# Patient Record
Sex: MALE | Race: WHITE | NOT HISPANIC OR LATINO | Employment: FULL TIME | URBAN - METROPOLITAN AREA
[De-identification: names, ages, dates, MRNs, and addresses within clinical notes are randomized per-mention and may not be internally consistent; named-entity substitution may affect disease eponyms.]

---

## 2018-03-15 ENCOUNTER — OFFICE VISIT (OUTPATIENT)
Dept: INTERNAL MEDICINE | Facility: CLINIC | Age: 58
End: 2018-03-15
Payer: COMMERCIAL

## 2018-03-15 ENCOUNTER — LAB VISIT (OUTPATIENT)
Dept: LAB | Facility: HOSPITAL | Age: 58
End: 2018-03-15
Attending: INTERNAL MEDICINE
Payer: COMMERCIAL

## 2018-03-15 VITALS
SYSTOLIC BLOOD PRESSURE: 130 MMHG | HEART RATE: 65 BPM | DIASTOLIC BLOOD PRESSURE: 76 MMHG | TEMPERATURE: 98 F | BODY MASS INDEX: 31.9 KG/M2 | WEIGHT: 203.69 LBS | RESPIRATION RATE: 15 BRPM

## 2018-03-15 DIAGNOSIS — E78.5 HYPERLIPIDEMIA, UNSPECIFIED HYPERLIPIDEMIA TYPE: ICD-10-CM

## 2018-03-15 DIAGNOSIS — Z11.59 NEED FOR HEPATITIS C SCREENING TEST: ICD-10-CM

## 2018-03-15 DIAGNOSIS — Z00.00 ANNUAL PHYSICAL EXAM: Primary | ICD-10-CM

## 2018-03-15 DIAGNOSIS — J45.909 ASTHMA, UNSPECIFIED ASTHMA SEVERITY, UNSPECIFIED WHETHER COMPLICATED, UNSPECIFIED WHETHER PERSISTENT: ICD-10-CM

## 2018-03-15 DIAGNOSIS — E66.2 CLASS 1 OBESITY WITH ALVEOLAR HYPOVENTILATION, SERIOUS COMORBIDITY, AND BODY MASS INDEX (BMI) OF 31.0 TO 31.9 IN ADULT: ICD-10-CM

## 2018-03-15 DIAGNOSIS — Z76.89 ENCOUNTER TO ESTABLISH CARE WITH NEW DOCTOR: ICD-10-CM

## 2018-03-15 DIAGNOSIS — Z11.3 SCREEN FOR STD (SEXUALLY TRANSMITTED DISEASE): ICD-10-CM

## 2018-03-15 DIAGNOSIS — Z12.5 SCREENING FOR PROSTATE CANCER: ICD-10-CM

## 2018-03-15 DIAGNOSIS — Z23 NEED FOR TD VACCINE: ICD-10-CM

## 2018-03-15 DIAGNOSIS — Z00.00 ANNUAL PHYSICAL EXAM: ICD-10-CM

## 2018-03-15 LAB
25(OH)D3+25(OH)D2 SERPL-MCNC: 39 NG/ML
ALBUMIN SERPL BCP-MCNC: 3.7 G/DL
ALP SERPL-CCNC: 112 U/L
ALT SERPL W/O P-5'-P-CCNC: 27 U/L
ANION GAP SERPL CALC-SCNC: 9 MMOL/L
AST SERPL-CCNC: 29 U/L
BASOPHILS # BLD AUTO: 0.06 K/UL
BASOPHILS NFR BLD: 0.7 %
BILIRUB SERPL-MCNC: 0.5 MG/DL
BUN SERPL-MCNC: 16 MG/DL
CALCIUM SERPL-MCNC: 9.7 MG/DL
CHLORIDE SERPL-SCNC: 107 MMOL/L
CHOLEST SERPL-MCNC: 181 MG/DL
CHOLEST/HDLC SERPL: 3.4 {RATIO}
CO2 SERPL-SCNC: 26 MMOL/L
COMPLEXED PSA SERPL-MCNC: 0.59 NG/ML
CREAT SERPL-MCNC: 1 MG/DL
DIFFERENTIAL METHOD: ABNORMAL
EOSINOPHIL # BLD AUTO: 0.2 K/UL
EOSINOPHIL NFR BLD: 2.1 %
ERYTHROCYTE [DISTWIDTH] IN BLOOD BY AUTOMATED COUNT: 13.8 %
EST. GFR  (AFRICAN AMERICAN): >60 ML/MIN/1.73 M^2
EST. GFR  (NON AFRICAN AMERICAN): >60 ML/MIN/1.73 M^2
ESTIMATED AVG GLUCOSE: 108 MG/DL
GLUCOSE SERPL-MCNC: 99 MG/DL
HBA1C MFR BLD HPLC: 5.4 %
HCT VFR BLD AUTO: 45.2 %
HCV AB SERPL QL IA: NEGATIVE
HDLC SERPL-MCNC: 54 MG/DL
HDLC SERPL: 29.8 %
HGB BLD-MCNC: 16.1 G/DL
HIV 1+2 AB+HIV1 P24 AG SERPL QL IA: NEGATIVE
IMM GRANULOCYTES # BLD AUTO: 0.01 K/UL
IMM GRANULOCYTES NFR BLD AUTO: 0.1 %
LDLC SERPL CALC-MCNC: 110.8 MG/DL
LYMPHOCYTES # BLD AUTO: 1 K/UL
LYMPHOCYTES NFR BLD: 11.8 %
MCH RBC QN AUTO: 35.5 PG
MCHC RBC AUTO-ENTMCNC: 35.6 G/DL
MCV RBC AUTO: 100 FL
MONOCYTES # BLD AUTO: 0.6 K/UL
MONOCYTES NFR BLD: 7.4 %
NEUTROPHILS # BLD AUTO: 6.4 K/UL
NEUTROPHILS NFR BLD: 77.9 %
NONHDLC SERPL-MCNC: 127 MG/DL
NRBC BLD-RTO: 0 /100 WBC
PLATELET # BLD AUTO: 212 K/UL
PMV BLD AUTO: 11.6 FL
POTASSIUM SERPL-SCNC: 4.8 MMOL/L
PROT SERPL-MCNC: 7.3 G/DL
RBC # BLD AUTO: 4.53 M/UL
SODIUM SERPL-SCNC: 142 MMOL/L
T4 FREE SERPL-MCNC: 0.8 NG/DL
TRIGL SERPL-MCNC: 81 MG/DL
TSH SERPL DL<=0.005 MIU/L-ACNC: 3.49 UIU/ML
WBC # BLD AUTO: 8.19 K/UL

## 2018-03-15 PROCEDURE — 90714 TD VACC NO PRESV 7 YRS+ IM: CPT | Mod: S$GLB,,, | Performed by: INTERNAL MEDICINE

## 2018-03-15 PROCEDURE — 99386 PREV VISIT NEW AGE 40-64: CPT | Mod: 25,S$GLB,, | Performed by: INTERNAL MEDICINE

## 2018-03-15 PROCEDURE — 84153 ASSAY OF PSA TOTAL: CPT

## 2018-03-15 PROCEDURE — 99999 PR PBB SHADOW E&M-NEW PATIENT-LVL III: CPT | Mod: PBBFAC,,, | Performed by: INTERNAL MEDICINE

## 2018-03-15 PROCEDURE — 82306 VITAMIN D 25 HYDROXY: CPT

## 2018-03-15 PROCEDURE — 36415 COLL VENOUS BLD VENIPUNCTURE: CPT | Mod: PO

## 2018-03-15 PROCEDURE — 84443 ASSAY THYROID STIM HORMONE: CPT

## 2018-03-15 PROCEDURE — 86592 SYPHILIS TEST NON-TREP QUAL: CPT

## 2018-03-15 PROCEDURE — 90471 IMMUNIZATION ADMIN: CPT | Mod: S$GLB,,, | Performed by: INTERNAL MEDICINE

## 2018-03-15 PROCEDURE — 80061 LIPID PANEL: CPT

## 2018-03-15 PROCEDURE — 86803 HEPATITIS C AB TEST: CPT

## 2018-03-15 PROCEDURE — 87529 HSV DNA AMP PROBE: CPT | Mod: 59

## 2018-03-15 PROCEDURE — 80053 COMPREHEN METABOLIC PANEL: CPT

## 2018-03-15 PROCEDURE — 85025 COMPLETE CBC W/AUTO DIFF WBC: CPT

## 2018-03-15 PROCEDURE — 84439 ASSAY OF FREE THYROXINE: CPT

## 2018-03-15 PROCEDURE — 86703 HIV-1/HIV-2 1 RESULT ANTBDY: CPT

## 2018-03-15 PROCEDURE — 83036 HEMOGLOBIN GLYCOSYLATED A1C: CPT

## 2018-03-15 RX ORDER — ATORVASTATIN CALCIUM 20 MG/1
20 TABLET, FILM COATED ORAL DAILY
Qty: 90 TABLET | Refills: 3 | Status: SHIPPED | OUTPATIENT
Start: 2018-03-15 | End: 2018-08-31 | Stop reason: SDUPTHER

## 2018-03-15 RX ORDER — ATORVASTATIN CALCIUM 20 MG/1
20 TABLET, FILM COATED ORAL DAILY
Qty: 30 TABLET | Refills: 5 | Status: SHIPPED | OUTPATIENT
Start: 2018-03-15 | End: 2018-03-15 | Stop reason: SDUPTHER

## 2018-03-15 RX ORDER — ALBUTEROL SULFATE 90 UG/1
1-2 AEROSOL, METERED RESPIRATORY (INHALATION) EVERY 6 HOURS PRN
Qty: 18 G | Refills: 6 | Status: SHIPPED | OUTPATIENT
Start: 2018-03-15 | End: 2018-10-01 | Stop reason: SDUPTHER

## 2018-03-15 NOTE — PROGRESS NOTES
Subjective:       Patient ID: Maxx Lo is a 57 y.o. male.    Chief Complaint: Annual Exam and Establish Care    HPI   57 y.o. male here for annual physical exam.  He is new to me.  He was seeing doctor at [facility]    Chronic medical issues:  HLD: stable on statin, ran out recently and needs refill    Asthma: notes some wheezing recently, no cough, no fevers/chills, no recent exacerbation, ran out of inhaler    Health maintenance    Vaccines: Influenza declines (yearly)  Tetanus due (every 10 yrs - 1st tdap);   Pneumovax UTD   A1c: due (>45 yearly)  HIV: agrees ages 15-65  Sexual Screening: negative  STD screening: agrees  Eye exam:due, will check with insurance annual  DDS exam: due, will make appt q6 mos.  Prostate: PSA annual due  Colonoscopy: due 2019   Lipid disorders: due ages >/= 45 or >/= 20 if increased ASCVD risk   Hepatitis C: due one time born between [id number]      Exercise: not currently exercise  Diet: not eating healthy, lots of fast food      Past Medical History:   Diagnosis Date    ALLERGIC RHINITIS     Bronchial asthma     Hyperlipidemia     Hypertension     not on medication    Obesity       Past Surgical History:   Procedure Laterality Date    Repair ruptured left biceps  2004     Social History     Social History    Marital status:      Spouse name: N/A    Number of children: N/A    Years of education: N/A     Occupational History    mel      Social History Main Topics    Smoking status: Never Smoker    Smokeless tobacco: Never Used    Alcohol use 1.5 oz/week     3 Standard drinks or equivalent per week      Comment: 2x/week    Drug use: No    Sexual activity: Not on file     Other Topics Concern    Not on file     Social History Narrative    No narrative on file     Review of patient's allergies indicates:  No Known Allergies  Mr. Lo had no medications administered during this visit.      Review of Systems   Constitutional: Positive for  fatigue. Negative for activity change, chills and fever.   HENT: Negative for congestion, rhinorrhea, sinus pain, sinus pressure and sore throat.    Eyes: Negative for pain and redness.   Respiratory: Positive for wheezing (occasional). Negative for cough and shortness of breath.    Cardiovascular: Negative for chest pain and palpitations.   Gastrointestinal: Negative for abdominal pain, diarrhea, nausea and vomiting.   Genitourinary: Positive for urgency (+dribbling). Negative for difficulty urinating.   Musculoskeletal: Negative for arthralgias and back pain.   Skin: Negative for rash.   Neurological: Negative for weakness and headaches.   Psychiatric/Behavioral: Negative for dysphoric mood and sleep disturbance.     Objective:     Vitals:    03/15/18 0807   BP: 130/76   Pulse: 65   Resp: 15   Temp: 98.2 °F (36.8 °C)    Body mass index is 31.9 kg/m².  Physical Exam   Constitutional: He is oriented to person, place, and time. He appears well-developed and well-nourished.   HENT:   Head: Normocephalic and atraumatic.   Mouth/Throat: Oropharynx is clear and moist.   Eyes: Conjunctivae are normal. Pupils are equal, round, and reactive to light.   Neck: Normal range of motion. Neck supple. No thyromegaly present.   Cardiovascular: Normal rate, regular rhythm and normal heart sounds.  Exam reveals no gallop and no friction rub.    No murmur heard.  Pulmonary/Chest: Effort normal. He has wheezes (few expiratory wheezes noted). He has no rales.   Abdominal: Soft. Bowel sounds are normal. There is no tenderness. There is no rebound and no guarding.   Musculoskeletal: Normal range of motion. He exhibits edema (mild pedal edema b/l). He exhibits no tenderness.   Lymphadenopathy:     He has no cervical adenopathy.   Neurological: He is alert and oriented to person, place, and time.   Skin: Skin is warm and dry. No rash noted. No erythema.   Psychiatric: He has a normal mood and affect. Judgment normal.     Assessment:     1.  Annual physical exam    2. Encounter to establish care with new doctor    3. Need for hepatitis C screening test    4. Screening for prostate cancer    5. Screen for STD (sexually transmitted disease)    6. Hyperlipidemia, unspecified hyperlipidemia type    7. Asthma, unspecified asthma severity, unspecified whether complicated, unspecified whether persistent    8. Need for TD vaccine    9. Class 1 obesity with alveolar hypoventilation, serious comorbidity, and body mass index (BMI) of 31.0 to 31.9 in adult      Plan:   1. Annual physical exam  - CBC auto differential; Future  - Comprehensive metabolic panel; Future  - Hemoglobin A1c; Future  - Lipid panel; Future  - TSH; Future  - Urinalysis Microscopic; Future  - Vitamin D; Future  - T4, free; Future  - C. trachomatis/N. gonorrhoeae by AMP DNA Urine; Future  - Herpes simplex Virus (HSV) Type 1 & 2 DNA by PCR; Future  - HIV-1 and HIV-2 antibodies; Future  - RPR; Future    2. Encounter to establish care with new doctor    3. Need for hepatitis C screening test  - Hepatitis C antibody; Future    4. Screening for prostate cancer  - PSA, Screening; Future    5. Screen for STD (sexually transmitted disease)  - C. trachomatis/N. gonorrhoeae by AMP DNA Urine; Future  - Herpes simplex Virus (HSV) Type 1 & 2 DNA by PCR; Future  - HIV-1 and HIV-2 antibodies; Future  - RPR; Future    6. Hyperlipidemia, unspecified hyperlipidemia type  - atorvastatin (LIPITOR) 20 MG tablet; Take 1 tablet (20 mg total) by mouth once daily.  Dispense: 90 tablet; Refill: 3    7. Asthma, unspecified asthma severity, unspecified whether complicated, unspecified whether persistent  - counseled to use inhaler, if breathing worsens, please call or go to ER if severe  - albuterol (PROAIR HFA) 90 mcg/actuation inhaler; Inhale 1-2 puffs into the lungs every 6 (six) hours as needed for Wheezing or Shortness of Breath. generic ok  Dispense: 18 g; Refill: 6    8. Need for TD vaccine  - (In Office  Administered) Td Vaccine    9. Class 1 obesity with alveolar hypoventilation, serious comorbidity, and body mass index (BMI) of 31.0 to 31.9 in adult  - Counseled patient on need to get regular exercise at least 30 minutes a day at high intensity 5 days a week.  - counseled on HHD      Return to clinic in one year for annual exam or sooner if dictated by labs or illness.

## 2018-03-15 NOTE — PATIENT INSTRUCTIONS
Exercise for a Healthier Heart  You may wonder how you can improve the health of your heart. If youre thinking about exercise, youre on the right track. You dont need to become an athlete, but you do need a certain amount of brisk exercise to help strengthen your heart. If you have been diagnosed with a heart condition, your doctor may recommend exercise to help stabilize your condition. To help make exercise a habit, choose safe, fun activities.     Exercise with a friend. When activity is fun, you're more likely to stick with it.     Be sure to check with your healthcare provider before starting an exercise program.   Why exercise?  Exercising regularly offers many healthy rewards. It can help you do all of the following:  · Improve your blood cholesterol level to help prevent further heart trouble  · Lower your blood pressure to help prevent a stroke or heart attack  · Control diabetes, or reduce your risk of getting this disease  · Improve your heart and lung function  · Reach and maintain a healthy weight  · Make your muscles stronger and more limber so you can stay active  · Prevent falls and fractures by slowing the loss of bone mass (osteoporosis)  · Manage stress better  · Reduce your blood pressure  · Improve your sense of self and your body image  Exercise tips  Ease into your routine. Set small goals. Then build on them.  Exercise on most days. Aim for a total of 150 or more minutes of moderate to  vigorous intensity activity each week. Consider 40 minutes, 3 to 4 times a week. For best results, activity should last for 40 minutes on average. It is OK to work up to the 40 minute period over time. Examples of moderate-intensity activity is walking 1 mile in 15 minutes or 30 to 45 minutes of yard work.  Step up your daily activity level. Along with your exercise program, try being more active throughout the day. Walk instead of drive. Do more household tasks or yard work.  Choose one or more  activities you enjoy. Walking is one of the easiest things you can do. You can also try swimming, riding a bike, dancing, or taking an exercise class.  Stop exercising and call your doctor if you:  · Have chest pain or feel dizzy or lightheaded  · Feel burning, tightness, pressure, or heaviness in your chest, neck, shoulders, back, or arms  · Have unusual shortness of breath  · Have increased joint or muscle pain  · Have palpitations or an irregular heartbeat   Date Last Reviewed: 5/1/2016 © 2000-2017 [employer]. [address] [place] [address]. All rights reserved. This information is not intended as a substitute for professional medical care. Always follow your healthcare professional's instructions.      Mediterranean style diet:    Eat:  Olive oil, lean meats such as chicken and fish and only small servings of carbohydrates.   Olive oil and vinegar instead of low fat salad dressings.  Cook food in olive oil.  You can pan husain or saute fish and vegetables instead of boiling or baking.  Unsalted nuts for snacks. Walnuts, cashews, almonds, pecans and pistachios ( not peanuts). Try almond butter or cashew butter on toast or crackers.  Brown bread. You can also dip bread in olive oil and eat it as a snack or appetizer  Seasonal or frozen vegetables and fruits.     Avoid:  Saturated fats and deep fried foods. Also stay away from large servings of starches, sweets, desserts and sugary drinks (both sodas and fruit juices)

## 2018-03-16 LAB
HSV-1 DNA BY PCR: NEGATIVE
HSV-2 DNA BY PCR: NEGATIVE
RPR SER QL: NORMAL

## 2018-03-19 ENCOUNTER — TELEPHONE (OUTPATIENT)
Dept: INTERNAL MEDICINE | Facility: CLINIC | Age: 58
End: 2018-03-19

## 2018-03-19 DIAGNOSIS — D53.9 MACROCYTIC ANEMIA: Primary | ICD-10-CM

## 2018-03-19 NOTE — TELEPHONE ENCOUNTER
Please notify patient:  Labs show increase in size of red blood cells.  I would like to screen for vitamin deficiency as this can be a sign of this. (orders placed)    I have reviewed the rest of the lab results which are normal or stable.     Thanks,  Marina Smith MD

## 2018-03-19 NOTE — LETTER
March 20, 2018    Maxx Lo  [address] [facility] [place] [address]             [facility]  [address] [facility] [address]  Phone: [phone]  Fax: [phone] Dear Mr. Lo:    Your labs show increase in size of red blood cells.  I would like to screen for vitamin deficiency as this can be a sign of this. Please call to schedule lab appointment.     I have reviewed the rest of the lab results which are normal or stable.      Thanks,  Marina Smith MD    If you have any questions or concerns, please don't hesitate to call.    Sincerely,        Deena Guerrero LPN

## 2018-08-31 DIAGNOSIS — E78.5 HYPERLIPIDEMIA, UNSPECIFIED HYPERLIPIDEMIA TYPE: ICD-10-CM

## 2018-08-31 RX ORDER — ATORVASTATIN CALCIUM 20 MG/1
TABLET, FILM COATED ORAL
Qty: 30 TABLET | Refills: 2 | Status: SHIPPED | OUTPATIENT
Start: 2018-08-31 | End: 2018-11-14

## 2018-10-01 ENCOUNTER — HOSPITAL ENCOUNTER (EMERGENCY)
Facility: HOSPITAL | Age: 58
Discharge: HOME | End: 2018-10-01
Attending: EMERGENCY MEDICINE
Payer: COMMERCIAL

## 2018-10-01 VITALS
HEIGHT: 67 IN | HEART RATE: 75 BPM | BODY MASS INDEX: 31.39 KG/M2 | DIASTOLIC BLOOD PRESSURE: 86 MMHG | TEMPERATURE: 98 F | SYSTOLIC BLOOD PRESSURE: 158 MMHG | OXYGEN SATURATION: 95 % | WEIGHT: 200 LBS | RESPIRATION RATE: 20 BRPM

## 2018-10-01 DIAGNOSIS — R05.9 COUGH: ICD-10-CM

## 2018-10-01 DIAGNOSIS — M25.512 LEFT SHOULDER PAIN: ICD-10-CM

## 2018-10-01 DIAGNOSIS — S22.32XA CLOSED FRACTURE OF ONE RIB OF LEFT SIDE, INITIAL ENCOUNTER: Primary | ICD-10-CM

## 2018-10-01 DIAGNOSIS — J45.909 ASTHMA, UNSPECIFIED ASTHMA SEVERITY, UNSPECIFIED WHETHER COMPLICATED, UNSPECIFIED WHETHER PERSISTENT: ICD-10-CM

## 2018-10-01 PROBLEM — S01.81XA LACERATION OF FOREHEAD: Status: ACTIVE | Noted: 2018-10-01

## 2018-10-01 PROCEDURE — 90471 IMMUNIZATION ADMIN: CPT | Performed by: EMERGENCY MEDICINE

## 2018-10-01 PROCEDURE — 99284 EMERGENCY DEPT VISIT MOD MDM: CPT | Mod: 25

## 2018-10-01 PROCEDURE — 63600175 PHARM REV CODE 636 W HCPCS: Performed by: EMERGENCY MEDICINE

## 2018-10-01 PROCEDURE — 94799 UNLISTED PULMONARY SVC/PX: CPT

## 2018-10-01 PROCEDURE — 94640 AIRWAY INHALATION TREATMENT: CPT

## 2018-10-01 PROCEDURE — 90715 TDAP VACCINE 7 YRS/> IM: CPT | Performed by: EMERGENCY MEDICINE

## 2018-10-01 PROCEDURE — 25000242 PHARM REV CODE 250 ALT 637 W/ HCPCS: Performed by: EMERGENCY MEDICINE

## 2018-10-01 PROCEDURE — 25000003 PHARM REV CODE 250: Performed by: EMERGENCY MEDICINE

## 2018-10-01 RX ORDER — HYDROCODONE BITARTRATE AND ACETAMINOPHEN 10; 325 MG/1; MG/1
1 TABLET ORAL
Status: COMPLETED | OUTPATIENT
Start: 2018-10-01 | End: 2018-10-01

## 2018-10-01 RX ORDER — HYDROCODONE BITARTRATE AND ACETAMINOPHEN 10; 325 MG/1; MG/1
1 TABLET ORAL EVERY 8 HOURS PRN
Qty: 18 TABLET | Refills: 0 | Status: SHIPPED | OUTPATIENT
Start: 2018-10-01 | End: 2018-10-19 | Stop reason: ALTCHOICE

## 2018-10-01 RX ORDER — DIAZEPAM 5 MG/1
5 TABLET ORAL
Status: COMPLETED | OUTPATIENT
Start: 2018-10-01 | End: 2018-10-01

## 2018-10-01 RX ORDER — IPRATROPIUM BROMIDE AND ALBUTEROL SULFATE 2.5; .5 MG/3ML; MG/3ML
3 SOLUTION RESPIRATORY (INHALATION)
Status: COMPLETED | OUTPATIENT
Start: 2018-10-01 | End: 2018-10-01

## 2018-10-01 RX ORDER — BACITRACIN ZINC 500 UNIT/G
OINTMENT (GRAM) TOPICAL
Status: COMPLETED | OUTPATIENT
Start: 2018-10-01 | End: 2018-10-01

## 2018-10-01 RX ORDER — AZITHROMYCIN 250 MG/1
250 TABLET, FILM COATED ORAL DAILY
Qty: 6 TABLET | Refills: 0 | Status: SHIPPED | OUTPATIENT
Start: 2018-10-01 | End: 2018-10-19 | Stop reason: ALTCHOICE

## 2018-10-01 RX ORDER — ALBUTEROL SULFATE 90 UG/1
1-2 AEROSOL, METERED RESPIRATORY (INHALATION) EVERY 6 HOURS PRN
Qty: 18 G | Refills: 1 | Status: SHIPPED | OUTPATIENT
Start: 2018-10-01 | End: 2020-09-23 | Stop reason: SDUPTHER

## 2018-10-01 RX ORDER — NAPROXEN 500 MG/1
500 TABLET ORAL
Status: COMPLETED | OUTPATIENT
Start: 2018-10-01 | End: 2018-10-01

## 2018-10-01 RX ADMIN — CLOSTRIDIUM TETANI TOXOID ANTIGEN (FORMALDEHYDE INACTIVATED), CORYNEBACTERIUM DIPHTHERIAE TOXOID ANTIGEN (FORMALDEHYDE INACTIVATED), BORDETELLA PERTUSSIS TOXOID ANTIGEN (GLUTARALDEHYDE INACTIVATED), BORDETELLA PERTUSSIS FILAMENTOUS HEMAGGLUTININ ANTIGEN (FORMALDEHYDE INACTIVATED), BORDETELLA PERTUSSIS PERTACTIN ANTIGEN, AND BORDETELLA PERTUSSIS FIMBRIAE 2/3 ANTIGEN 0.5 ML: 5; 2; 2.5; 5; 3; 5 INJECTION, SUSPENSION INTRAMUSCULAR at 06:10

## 2018-10-01 RX ADMIN — NAPROXEN 500 MG: 500 TABLET ORAL at 06:10

## 2018-10-01 RX ADMIN — HYDROCODONE BITARTRATE AND ACETAMINOPHEN 1 TABLET: 10; 325 TABLET ORAL at 06:10

## 2018-10-01 RX ADMIN — DIAZEPAM 5 MG: 5 TABLET ORAL at 06:10

## 2018-10-01 RX ADMIN — IPRATROPIUM BROMIDE AND ALBUTEROL SULFATE 3 ML: .5; 3 SOLUTION RESPIRATORY (INHALATION) at 06:10

## 2018-10-01 RX ADMIN — BACITRACIN: 500 OINTMENT TOPICAL at 06:10

## 2018-10-01 NOTE — ED NOTES
Neuro: AAOx3  HEENT: WDL  Resp: Airway patent, SOB reported, Pt has cough and left rib pain.   Cardiac: Skin pink/warm/dry, pulses intact. Pt denies any chest pain  Abdomen: Soft, non-tender to palpation, denies N/V/D  : No signs or symptoms of urinary disturbances  Musculoskeletal: Left sided rib pain, upper back pain, pt able to move all extremities  Skin: small abrasion to left forehead. Scant bleeding to wound. Abrasion to left elbow. Wounds cleaned with normal saline. Ointment applied to wounds.    Patient reports he was stepping out of car and fell and hit his head on the concrete. Pt has abrasion to left forehead and left elbow. He also reports some SOB with cough. Pt splinting left side when he coughs.

## 2018-10-01 NOTE — ED PROVIDER NOTES
"Encounter Date: 10/1/2018    SCRIBE #1 NOTE: I, Rickey Woodard, am scribing for, and in the presence of,  Dr. Nuria Desai. I have scribed the entire note.       History     Chief Complaint   Patient presents with    Fall     pt fell last night onto his left side. Also hit his head on the ground. C/o pain to left upper back and generalized soreness.     Maxx Lo is a 57 y/o male who  has a past medical history of ALLERGIC RHINITIS, Bronchial asthma, Hyperlipidemia, Hypertension, and Obesity.    The patient presents to the ED due to fall that occurred last night. Patient reports he lost his balance while getting out of his vehicle and fell on his left side, hitting his head on the ground. He admits to cough, left upper back pain, and "chest cold" but denies any neck pain, dizziness, or LOC. He is not on any anticoagulation. Patient unsure about Tetanus status. He denies smoking cigarettes. Patient also requesting for albuterol inhaler refill. He denies any other physical complaints.        The history is provided by the patient.     Review of patient's allergies indicates:  No Known Allergies  Past Medical History:   Diagnosis Date    ALLERGIC RHINITIS     Bronchial asthma     Hyperlipidemia     Hypertension     not on medication    Obesity      Past Surgical History:   Procedure Laterality Date    Repair ruptured left biceps  2004     Family History   Problem Relation Age of Onset    Coronary artery disease Father     Diabetes Mother     Heart disease Mother     Diabetes Sister     Hypertension Brother      Social History     Tobacco Use    Smoking status: Never Smoker    Smokeless tobacco: Never Used   Substance Use Topics    Alcohol use: Yes     Alcohol/week: 1.5 oz     Types: 3 Standard drinks or equivalent per week     Comment: 2x/week    Drug use: No     Review of Systems   Constitutional: Negative for chills and fever.   HENT: Negative for congestion, ear pain, rhinorrhea and sore " throat.    Eyes: Negative for pain and visual disturbance.   Respiratory: Positive for cough. Negative for shortness of breath and wheezing.    Cardiovascular: Negative for chest pain and palpitations.   Gastrointestinal: Negative for abdominal pain, diarrhea, nausea and vomiting.   Genitourinary: Negative for dysuria and hematuria.   Musculoskeletal: Positive for back pain. Negative for myalgias and neck pain.   Skin: Negative for rash.   Neurological: Negative for dizziness, syncope, weakness, light-headedness and headaches.   Psychiatric/Behavioral: Negative for confusion.   All other systems reviewed and are negative.      Physical Exam     Initial Vitals [10/01/18 1549]   BP Pulse Resp Temp SpO2   (!) 184/105 91 20 98.6 °F (37 °C) 98 %      MAP       --         Physical Exam    Nursing note and vitals reviewed.  Constitutional: He appears well-developed and well-nourished. He is not diaphoretic. No distress.   HENT:   Head: Normocephalic. Head is without raccoon's eyes, without Erickson's sign and without contusion.   Right Ear: Tympanic membrane, external ear and ear canal normal.   Left Ear: Tympanic membrane, external ear and ear canal normal.   Nose: Nose normal. No nasal septal hematoma.   3 cm linear laceration to left forehead.  No hemotympanum.   Eyes: Conjunctivae and EOM are normal. Pupils are equal, round, and reactive to light. Right eye exhibits no discharge. Left eye exhibits no discharge. No scleral icterus.   Neck: Normal range of motion. Neck supple.   Cardiovascular: Normal rate, regular rhythm, normal heart sounds and intact distal pulses. Exam reveals no gallop and no friction rub.    No murmur heard.  Pulmonary/Chest: No respiratory distress. He has no wheezes. He has no rhonchi. He has no rales.   Mildly coarse breath sounds bilaterally.   Abdominal: Soft. Bowel sounds are normal. He exhibits no distension. There is no tenderness. There is no rebound and no guarding.   Musculoskeletal:  Normal range of motion. He exhibits tenderness. He exhibits no edema.   No bony midline C/T/L spine TTP.  Superficial abrasion to left elbow.  2+ radial pulse bilaterally.  Left sided thoracic paraspinal muscular TTP.   Neurological: He is alert and oriented to person, place, and time. He has normal strength. No cranial nerve deficit or sensory deficit. GCS score is 15. GCS eye subscore is 4. GCS verbal subscore is 5. GCS motor subscore is 6.   Skin: Skin is warm and dry. Capillary refill takes less than 2 seconds.   Psychiatric: He has a normal mood and affect.         ED Course   Procedures  Labs Reviewed - No data to display       Imaging Results          X-Ray Chest PA And Lateral (Final result)  Result time 10/01/18 18:37:07    Final result by Donnie Samano MD (10/01/18 18:37:07)                 Impression:      No acute cardiopulmonary process identified.      Electronically signed by: Donnie Samano MD  Date:    10/01/2018  Time:    18:37             Narrative:    EXAMINATION:  XR CHEST PA AND LATERAL    CLINICAL HISTORY:  Cough    TECHNIQUE:  PA and lateral views of the chest were performed.    COMPARISON:  September 2011.    FINDINGS:  Cardiac silhouette is normal in size.  Lungs are symmetrically expanded.  No evidence of focal consolidative process, pneumothorax, or significant effusion.  No acute osseous abnormality identified.                               CT Head Without Contrast (Final result)  Result time 10/01/18 18:01:56    Final result by Addison Bui MD (10/01/18 18:01:56)                 Impression:      No acute intracranial abnormality.      Electronically signed by: Addison Bui MD  Date:    10/01/2018  Time:    18:01             Narrative:    EXAMINATION:  CT HEAD WITHOUT CONTRAST    CLINICAL HISTORY:  fall;    TECHNIQUE:  Low dose axial CT images obtained throughout the head without intravenous contrast. Sagittal and coronal reconstructions were  performed.    COMPARISON:  None.    FINDINGS:  Intracranial compartment:    Ventricles and sulci are normal in size for age without evidence of hydrocephalus. No extra-axial blood or fluid collections.    The brain parenchyma appears normal. No parenchymal mass, hemorrhage, edema or major vascular distribution infarct.    Skull/extracranial contents (limited evaluation): No fracture. Mastoid air cells and paranasal sinuses are essentially clear.  Visualized portions of the orbits are within normal limits.                               X-Ray Shoulder Trauma Left (Final result)  Result time 10/01/18 17:59:07    Final result by Addison Bui MD (10/01/18 17:59:07)                 Impression:      Posterolateral left 5th rib acute fracture.      Electronically signed by: Addison Bui MD  Date:    10/01/2018  Time:    17:59             Narrative:    EXAMINATION:  XR SHOULDER TRAUMA 3 VIEW LEFT    CLINICAL HISTORY:  Pain in left shoulder    TECHNIQUE:  Three views of the left shoulder were performed.    COMPARISON  Chest radiograph 09/09/2011    FINDINGS:  Bones are well mineralized.  Overall alignment is within normal limits.  There is displaced fracture with mild overlap at the posterolateral left 5th rib.  No dislocation or destructive osseous process.  Mild degenerative change at the left AC joint..  No subcutaneous emphysema or radiodense retained foreign body.  No left apical pneumothorax.                                 Medical Decision Making:   Initial Assessment:   Maxx Lo is a 59 y/o male presents to the ED due to fall that occurred last night.  Plan to obtain imaging, treat symptomatically, and reassess.  Differential Diagnosis:   Laceration, abrasion, contusion, fracture, dislocation  Clinical Tests:   Radiological Study: Ordered and Reviewed  ED Management:  Upon re-evaluation, the patient's status has improved.  After complete ED evaluation, clinical impression is most consistent with  fracture rib, laceration.  Laceration covered with Steri-Strips since it has been greater than 24 hr since onset.  ED workup reveals a fractured rib.  Patient was given incentive spirometer.  Tetanus vaccination updated  At this time, I feel there is no emergent condition requiring further evaluation or admission. I believe the patient is stable for discharge from the ED. The patient and any additional family present were updated with test results, overall clinical impression, and recommended further plan of care. All questions were answered. The patient expressed understanding and agreed with current plan for discharge with PCP follow-up within 1 week. Strict return precautions were provided, including CP, SOB, HA, return/worsening of current symptoms or any other concerns.                         Clinical Impression:   .  1. Closed fracture of one rib of left side, initial encounter    2. Left shoulder pain    3. Cough    4. Asthma, unspecified asthma severity, unspecified whether complicated, unspecified whether persistent            Disposition:   Disposition: Discharged  Condition: Stable       I, Nuria Desai,  personally performed the services described in this documentation. All medical record entries made by the scribe were at my direction and in my presence.  I have reviewed the chart and agree that the record reflects my personal performance and is accurate and complete. Nuria Desai M.D. 3:11 PM10/02/2018                   Nuria Desai MD  10/02/18 151

## 2018-10-02 ENCOUNTER — OFFICE VISIT (OUTPATIENT)
Dept: INTERNAL MEDICINE | Facility: CLINIC | Age: 58
End: 2018-10-02
Payer: COMMERCIAL

## 2018-10-02 VITALS
DIASTOLIC BLOOD PRESSURE: 100 MMHG | HEART RATE: 64 BPM | BODY MASS INDEX: 32.21 KG/M2 | TEMPERATURE: 98 F | HEIGHT: 67 IN | OXYGEN SATURATION: 98 % | RESPIRATION RATE: 18 BRPM | WEIGHT: 205.25 LBS | SYSTOLIC BLOOD PRESSURE: 140 MMHG

## 2018-10-02 DIAGNOSIS — S22.32XD CLOSED FRACTURE OF ONE RIB OF LEFT SIDE WITH ROUTINE HEALING, SUBSEQUENT ENCOUNTER: Primary | ICD-10-CM

## 2018-10-02 PROCEDURE — 3080F DIAST BP >= 90 MM HG: CPT | Mod: CPTII,S$GLB,, | Performed by: INTERNAL MEDICINE

## 2018-10-02 PROCEDURE — 99214 OFFICE O/P EST MOD 30 MIN: CPT | Mod: S$GLB,,, | Performed by: INTERNAL MEDICINE

## 2018-10-02 PROCEDURE — 3077F SYST BP >= 140 MM HG: CPT | Mod: CPTII,S$GLB,, | Performed by: INTERNAL MEDICINE

## 2018-10-02 PROCEDURE — 3008F BODY MASS INDEX DOCD: CPT | Mod: CPTII,S$GLB,, | Performed by: INTERNAL MEDICINE

## 2018-10-02 PROCEDURE — 99999 PR PBB SHADOW E&M-EST. PATIENT-LVL IV: CPT | Mod: PBBFAC,,, | Performed by: INTERNAL MEDICINE

## 2018-10-02 RX ORDER — NAPROXEN 500 MG/1
TABLET ORAL
Qty: 30 TABLET | Refills: 2 | Status: SHIPPED | OUTPATIENT
Start: 2018-10-02 | End: 2018-10-19 | Stop reason: SINTOL

## 2018-10-02 RX ORDER — LIDOCAINE 50 MG/G
1 PATCH TOPICAL DAILY
Qty: 30 PATCH | Refills: 2 | Status: SHIPPED | OUTPATIENT
Start: 2018-10-02 | End: 2018-11-08

## 2018-10-02 NOTE — PROGRESS NOTES
"Subjective:       Patient ID: Maxx Lo is a 58 y.o. male.    Chief Complaint: Establish Care; Fall (2 days ago. went to ER. Dx; broken rib); and Back Pain (from fall, 9/10 pain level)    HPI    He presents for evaluation of rib pain following ER visit yesterday. He was given a work excuse for light-duty, but his work needed completion of form which specifies exactly what physical activity he is allowed to do. He was seen in the ER yesterday following a fall which occurred at home.  X-ray obtained in the ER showed a posterolateral left 5th rib fracture. He was discharged with hydrocodone-acetominophen 10-325mg, which he states is not helping his pain. He is requesting a stronger pain medication.     He denies dyspnea. He is using incentive spirometer.     Review of Systems   Constitutional: Negative for chills and fever.   Respiratory: Positive for cough. Negative for shortness of breath.    Cardiovascular: Negative for chest pain.   Gastrointestinal: Negative for abdominal pain and blood in stool.   Genitourinary: Negative for difficulty urinating.   Musculoskeletal: Positive for arthralgias and myalgias.   Skin: Negative for rash.   Neurological: Negative for weakness and numbness.       Objective:        Vitals:    10/02/18 1507   BP: (!) 140/100   Pulse: 64   Resp: 18   Temp: 98.2 °F (36.8 °C)   SpO2: 98%   Weight: 93.1 kg (205 lb 4 oz)   Height: 5' 7" (1.702 m)       Body mass index is 32.15 kg/m².    Physical Exam   Constitutional: He is oriented to person, place, and time. He appears well-developed and well-nourished. No distress.   HENT:   Head: Normocephalic and atraumatic.   Nose: Nose normal.   Eyes: Conjunctivae and EOM are normal. Right eye exhibits no discharge. Left eye exhibits no discharge.   Neck: Normal range of motion. Neck supple.   Cardiovascular: Normal rate, regular rhythm, normal heart sounds and intact distal pulses.   Pulmonary/Chest: Effort normal. No accessory muscle usage. No " respiratory distress. He has no decreased breath sounds. He has wheezes.   Abdominal: Soft. Bowel sounds are normal.   Musculoskeletal: He exhibits no edema.        Left shoulder: He exhibits decreased range of motion.        Thoracic back: He exhibits tenderness and pain.   Neurological: He is alert and oriented to person, place, and time.   Skin: Skin is warm and dry. He is not diaphoretic. No erythema.   Psychiatric: He has a normal mood and affect. His behavior is normal. Thought content normal.       Assessment:     1. Closed fracture of one rib of left side with routine healing, subsequent encounter           Plan:         1. Closed fracture of one rib of left side with routine healing, subsequent encounter  - form completed  - can continue norco prn pain. I discussed with him that he may add tylenol for additional pain relief but limit to less than 4 grams per day (incudling norco).  - lidocaine (LIDODERM) 5 %; Place 1 patch onto the skin once daily.  Dispense: 30 patch; Refill: 2  - naproxen (NAPROSYN) 500 MG tablet; BIDWM x 7 days, then take BID prn pain  Dispense: 30 tablet; Refill: 2           Patient note was created using MModal Dictation.  Any errors in syntax or even information may not have been identified and edited on initial review prior to signing this note.

## 2018-10-19 ENCOUNTER — OFFICE VISIT (OUTPATIENT)
Dept: INTERNAL MEDICINE | Facility: CLINIC | Age: 58
End: 2018-10-19
Payer: COMMERCIAL

## 2018-10-19 ENCOUNTER — HOSPITAL ENCOUNTER (OUTPATIENT)
Dept: RADIOLOGY | Facility: HOSPITAL | Age: 58
Discharge: HOME | End: 2018-10-19
Attending: INTERNAL MEDICINE
Payer: COMMERCIAL

## 2018-10-19 VITALS
WEIGHT: 205 LBS | HEART RATE: 56 BPM | RESPIRATION RATE: 16 BRPM | BODY MASS INDEX: 32.18 KG/M2 | DIASTOLIC BLOOD PRESSURE: 100 MMHG | TEMPERATURE: 98 F | SYSTOLIC BLOOD PRESSURE: 162 MMHG | HEIGHT: 67 IN

## 2018-10-19 DIAGNOSIS — J98.11 ATELECTASIS: ICD-10-CM

## 2018-10-19 DIAGNOSIS — S22.32XD CLOSED FRACTURE OF ONE RIB OF LEFT SIDE WITH ROUTINE HEALING, SUBSEQUENT ENCOUNTER: Primary | ICD-10-CM

## 2018-10-19 DIAGNOSIS — S22.32XD CLOSED FRACTURE OF ONE RIB OF LEFT SIDE WITH ROUTINE HEALING, SUBSEQUENT ENCOUNTER: ICD-10-CM

## 2018-10-19 PROCEDURE — 99999 PR PBB SHADOW E&M-EST. PATIENT-LVL IV: CPT | Mod: PBBFAC,,, | Performed by: INTERNAL MEDICINE

## 2018-10-19 PROCEDURE — 3077F SYST BP >= 140 MM HG: CPT | Mod: CPTII,S$GLB,, | Performed by: INTERNAL MEDICINE

## 2018-10-19 PROCEDURE — 71100 X-RAY EXAM RIBS UNI 2 VIEWS: CPT | Mod: TC,PO

## 2018-10-19 PROCEDURE — 99214 OFFICE O/P EST MOD 30 MIN: CPT | Mod: S$GLB,,, | Performed by: INTERNAL MEDICINE

## 2018-10-19 PROCEDURE — 3080F DIAST BP >= 90 MM HG: CPT | Mod: CPTII,S$GLB,, | Performed by: INTERNAL MEDICINE

## 2018-10-19 PROCEDURE — 71100 X-RAY EXAM RIBS UNI 2 VIEWS: CPT | Mod: 26,,, | Performed by: RADIOLOGY

## 2018-10-19 PROCEDURE — 71046 X-RAY EXAM CHEST 2 VIEWS: CPT | Mod: 26,,, | Performed by: RADIOLOGY

## 2018-10-19 PROCEDURE — 3008F BODY MASS INDEX DOCD: CPT | Mod: CPTII,S$GLB,, | Performed by: INTERNAL MEDICINE

## 2018-10-19 PROCEDURE — 71046 X-RAY EXAM CHEST 2 VIEWS: CPT | Mod: TC,PO

## 2018-10-19 RX ORDER — HYDROCODONE BITARTRATE AND ACETAMINOPHEN 10; 325 MG/1; MG/1
1 TABLET ORAL EVERY 6 HOURS PRN
Qty: 28 TABLET | Refills: 0 | Status: SHIPPED | OUTPATIENT
Start: 2018-10-19 | End: 2018-10-26

## 2018-10-19 NOTE — PROGRESS NOTES
"Subjective:       Patient ID: Maxx Lo is a 58 y.o. male.    Chief Complaint: Forms for Work and Chest Pain (broken rib. )    HPI    He presents for completion of forms for [place] short[place] due to his a left posterior rib fracture.  He continues to have pain that limits his movement.  He has been applying lidocaine patches and taking naproxen as prescribed, but he reports a naproxen upsets his stomach.  He was given an incentive spirometer in the ER to use to 9 times per hour however he reports that he has not been doing hourly use of the incentive spirometer.  He reports that his work would like him to return on Monday but he not feel that he is able to.    Review of Systems   Constitutional: Negative for chills and fever.   Respiratory: Positive for cough. Negative for shortness of breath.    Musculoskeletal: Positive for arthralgias.       Objective:        Vitals:    10/19/18 1539   BP: (!) 162/100   BP Location: Right arm   Patient Position: Sitting   BP Method: Large (Manual)   Pulse: (!) 56   Resp: 16   Temp: 98.3 °F (36.8 °C)   TempSrc: Oral   Weight: 93 kg (205 lb 0.4 oz)   Height: 5' 7" (1.702 m)       Body mass index is 32.11 kg/m².    Physical Exam   Constitutional: He is oriented to person, place, and time. He appears well-developed and well-nourished. No distress.   HENT:   Head: Normocephalic.   Right Ear: External ear normal.   Left Ear: External ear normal.   Eyes: Conjunctivae and EOM are normal.   Neck: Normal range of motion.   Cardiovascular: Normal rate and intact distal pulses.   Pulmonary/Chest: Effort normal. No accessory muscle usage. No tachypnea. No respiratory distress. He has wheezes in the right lower field and the left lower field. He has rales in the right lower field and the left lower field.   Musculoskeletal: Normal range of motion.   Neurological: He is alert and oriented to person, place, and time.   Skin: Skin is warm and dry. No rash noted.   Psychiatric: " He has a normal mood and affect. His behavior is normal.       Assessment:     1. Closed fracture of one rib of left side with routine healing, subsequent encounter    2. Atelectasis           Plan:         1. Closed fracture of one rib of left side with routine healing, subsequent encounter  - encouraged frequent use of incentive spiroment  - HYDROcodone-acetaminophen (NORCO)  mg per tablet; Take 1 tablet by mouth every 6 (six) hours as needed for Pain.  Dispense: 28 tablet; Refill: 0 - Caution this medication is sedating.  Do not operate heavy machinery while taking this medication.  - X-Ray Ribs 2 View Left; Future    2. Atelectasis  - frequent use of incentive spirometer  - X-Ray Chest PA And Lateral; Future    Forms completed and given to pt.        Patient note was created using MModal Dictation.  Any errors in syntax or even information may not have been identified and edited on initial review prior to signing this note.

## 2018-10-22 ENCOUNTER — HOSPITAL ENCOUNTER (OUTPATIENT)
Facility: HOSPITAL | Age: 58
LOS: 1 days | Discharge: HOME | End: 2018-10-23
Attending: EMERGENCY MEDICINE | Admitting: HOSPITALIST
Payer: COMMERCIAL

## 2018-10-22 ENCOUNTER — TELEPHONE (OUTPATIENT)
Dept: INTERNAL MEDICINE | Facility: CLINIC | Age: 58
End: 2018-10-22

## 2018-10-22 DIAGNOSIS — J45.909 ASTHMA, UNSPECIFIED ASTHMA SEVERITY, UNSPECIFIED WHETHER COMPLICATED, UNSPECIFIED WHETHER PERSISTENT: ICD-10-CM

## 2018-10-22 DIAGNOSIS — R07.81 RIB PAIN: ICD-10-CM

## 2018-10-22 DIAGNOSIS — S22.42XA CLOSED FRACTURE OF MULTIPLE RIBS OF LEFT SIDE, INITIAL ENCOUNTER: Primary | ICD-10-CM

## 2018-10-22 PROBLEM — R06.2 WHEEZE: Status: ACTIVE | Noted: 2018-10-22

## 2018-10-22 PROBLEM — R05.9 COUGH: Status: ACTIVE | Noted: 2018-10-22

## 2018-10-22 PROCEDURE — 63600175 PHARM REV CODE 636 W HCPCS: Performed by: PHYSICIAN ASSISTANT

## 2018-10-22 PROCEDURE — G0378 HOSPITAL OBSERVATION PER HR: HCPCS

## 2018-10-22 PROCEDURE — 94799 UNLISTED PULMONARY SVC/PX: CPT

## 2018-10-22 PROCEDURE — 27100107 HC POCKET PEAK FLOW METER

## 2018-10-22 PROCEDURE — 25000242 PHARM REV CODE 250 ALT 637 W/ HCPCS: Performed by: PHYSICIAN ASSISTANT

## 2018-10-22 PROCEDURE — 25000003 PHARM REV CODE 250: Performed by: PHYSICIAN ASSISTANT

## 2018-10-22 PROCEDURE — 25000242 PHARM REV CODE 250 ALT 637 W/ HCPCS: Performed by: EMERGENCY MEDICINE

## 2018-10-22 PROCEDURE — 94761 N-INVAS EAR/PLS OXIMETRY MLT: CPT

## 2018-10-22 PROCEDURE — 99285 EMERGENCY DEPT VISIT HI MDM: CPT | Mod: 25

## 2018-10-22 PROCEDURE — 94640 AIRWAY INHALATION TREATMENT: CPT

## 2018-10-22 RX ORDER — ACETAMINOPHEN 325 MG/1
650 TABLET ORAL EVERY 4 HOURS PRN
Status: DISCONTINUED | OUTPATIENT
Start: 2018-10-22 | End: 2018-10-23 | Stop reason: HOSPADM

## 2018-10-22 RX ORDER — IPRATROPIUM BROMIDE AND ALBUTEROL SULFATE 2.5; .5 MG/3ML; MG/3ML
3 SOLUTION RESPIRATORY (INHALATION) EVERY 6 HOURS
Status: DISCONTINUED | OUTPATIENT
Start: 2018-10-22 | End: 2018-10-23 | Stop reason: HOSPADM

## 2018-10-22 RX ORDER — PROMETHAZINE HYDROCHLORIDE 25 MG/1
25 TABLET ORAL EVERY 6 HOURS PRN
Status: DISCONTINUED | OUTPATIENT
Start: 2018-10-22 | End: 2018-10-23 | Stop reason: HOSPADM

## 2018-10-22 RX ORDER — HYDROCODONE BITARTRATE AND ACETAMINOPHEN 5; 325 MG/1; MG/1
1 TABLET ORAL EVERY 6 HOURS PRN
Status: DISCONTINUED | OUTPATIENT
Start: 2018-10-22 | End: 2018-10-23 | Stop reason: HOSPADM

## 2018-10-22 RX ORDER — MORPHINE SULFATE ORAL SOLUTION 10 MG/5ML
10 SOLUTION ORAL EVERY 6 HOURS PRN
Status: DISCONTINUED | OUTPATIENT
Start: 2018-10-22 | End: 2018-10-23 | Stop reason: HOSPADM

## 2018-10-22 RX ORDER — SODIUM CHLORIDE 0.9 % (FLUSH) 0.9 %
5 SYRINGE (ML) INJECTION
Status: DISCONTINUED | OUTPATIENT
Start: 2018-10-22 | End: 2018-10-23 | Stop reason: HOSPADM

## 2018-10-22 RX ORDER — ONDANSETRON 8 MG/1
8 TABLET, ORALLY DISINTEGRATING ORAL EVERY 8 HOURS PRN
Status: DISCONTINUED | OUTPATIENT
Start: 2018-10-22 | End: 2018-10-23 | Stop reason: HOSPADM

## 2018-10-22 RX ORDER — PREDNISONE 20 MG/1
40 TABLET ORAL DAILY
Status: DISCONTINUED | OUTPATIENT
Start: 2018-10-22 | End: 2018-10-23 | Stop reason: HOSPADM

## 2018-10-22 RX ORDER — IPRATROPIUM BROMIDE AND ALBUTEROL SULFATE 2.5; .5 MG/3ML; MG/3ML
3 SOLUTION RESPIRATORY (INHALATION)
Status: COMPLETED | OUTPATIENT
Start: 2018-10-22 | End: 2018-10-22

## 2018-10-22 RX ADMIN — IPRATROPIUM BROMIDE AND ALBUTEROL SULFATE 3 ML: 2.5; .5 SOLUTION RESPIRATORY (INHALATION) at 11:10

## 2018-10-22 RX ADMIN — GUAIFENESIN AND DEXTROMETHORPHAN HYDROBROMIDE 1 TABLET: 600; 30 TABLET, EXTENDED RELEASE ORAL at 11:10

## 2018-10-22 RX ADMIN — PREDNISONE 40 MG: 20 TABLET ORAL at 03:10

## 2018-10-22 RX ADMIN — IPRATROPIUM BROMIDE AND ALBUTEROL SULFATE 3 ML: .5; 3 SOLUTION RESPIRATORY (INHALATION) at 07:10

## 2018-10-22 NOTE — ED NOTES
Pt states he fell on 10/1 and fractured 1 rib on his left side. Has been using his incentive spirometer since then, and states he is having continued pain to his left upper back. Has been unable to sleep in bed since his fall, so has been sleeping on a recliner. Pt states he has chronic bronchitis, and has been coughing up phlegm for about 1 month. Pt went to his pcp on Friday for followup and had a repeat chest xray. States his pcp called him this morning and instructed him to come to the ER because the xray showed 4 fractured ribs. Respirations are even, unlabored. Chest expansion is symmetrical. No bruising or swelling noted.

## 2018-10-22 NOTE — SUBJECTIVE & OBJECTIVE
Past Medical History:   Diagnosis Date    ALLERGIC RHINITIS     Bronchial asthma     Hyperlipemia     Hyperlipidemia     Hypertension     not on medication    Obesity        Past Surgical History:   Procedure Laterality Date    Repair ruptured left biceps  2004       Review of patient's allergies indicates:  No Known Allergies      Family History     Problem Relation (Age of Onset)    Coronary artery disease Father    Diabetes Mother, Sister    Heart disease Father, Mother    Hypertension Mother, Brother        Tobacco Use    Smoking status: Never Smoker    Smokeless tobacco: Never Used   Substance and Sexual Activity    Alcohol use: Yes     Alcohol/week: 1.5 oz     Types: 3 Standard drinks or equivalent per week     Comment: 2x/week    Drug use: No    Sexual activity: Not on file     Review of Systems   Constitutional: Negative for fever.   HENT: Negative for congestion.    Respiratory: Positive for cough and wheezing. Negative for shortness of breath.    Cardiovascular: Negative for chest pain.   Gastrointestinal: Negative for abdominal pain, nausea and vomiting.   Musculoskeletal: Positive for back pain.        Left sided rib pain    Skin: Negative for color change.   Allergic/Immunologic: Negative for immunocompromised state.   Hematological: Negative for adenopathy.   Psychiatric/Behavioral: Negative for agitation.   All other systems reviewed and are negative.    Objective:     Vital Signs (Most Recent):  Temp: 98.2 °F (36.8 °C) (10/22/18 1556)  Pulse: 87 (10/22/18 1556)  Resp: 20 (10/22/18 1556)  BP: 124/80 (10/22/18 1556)  SpO2: 96 % (10/22/18 1556) Vital Signs (24h Range):  Temp:  [98.1 °F (36.7 °C)-98.7 °F (37.1 °C)] 98.2 °F (36.8 °C)  Pulse:  [] 87  Resp:  [16-20] 20  SpO2:  [93 %-98 %] 96 %  BP: (124-159)/(74-99) 124/80     Weight: 86 kg (189 lb 9.5 oz)  Body mass index is 29.69 kg/m².    Physical Exam   Constitutional: He is oriented to person, place, and time. He appears  well-developed and well-nourished.   HENT:   Head: Normocephalic and atraumatic.   Right Ear: External ear normal.   Left Ear: External ear normal.   Nose: Nose normal.   Mouth/Throat: Oropharynx is clear and moist.   Neck: Normal range of motion. Neck supple.   Cardiovascular: Normal rate and regular rhythm.   Pulmonary/Chest: Effort normal. No stridor. No respiratory distress. He has wheezes in the right upper field and the left upper field. He has no rales. He exhibits no tenderness.   Abdominal: Soft. Bowel sounds are normal. He exhibits no distension and no mass. There is no tenderness. There is no guarding.   Musculoskeletal: Normal range of motion.        Arms:  Neurological: He is alert and oriented to person, place, and time.   Skin: Skin is warm. Capillary refill takes less than 2 seconds.   Psychiatric: He has a normal mood and affect. His behavior is normal.   Nursing note and vitals reviewed.          Significant Labs: All pertinent labs within the past 24 hours have been reviewed.    Significant Imaging: I have reviewed and interpreted all pertinent imaging results/findings within the past 24 hours.

## 2018-10-22 NOTE — HOSPITAL COURSE
CXR: Left rib series demonstrates  recent fractures with minor displacement at the posterolateral portions of left ribs 2, 3, 4, and 5.  Breathing treatments and prednisone given. Pt was encouraged to use incentive spirometry, which he did appropriately while in CDU.  Pt denied SOB, and felt that his breathing was at his baseline status.  Pt was discharged to home. He will be given prescription for prednisone and robaxin.  Pt has RX albuterol MDI and norco at home.

## 2018-10-22 NOTE — HPI
Maxx Lo, a 58 y.o. male with  has a past medical history of ALLERGIC RHINITIS, Bronchial asthma, Hyperlipemia, Hyperlipidemia, Hypertension, and Obesity.    He  presents to the ED for evaluation of multiple rib fractures.  Patient states that he had a trip and fall on 09/30 to his left side.  He had continued pain to this site and was evaluated by his PCP where an x-ray showed 1 rib fracture.  He was given pain medication and an incentive spirometry after visit.  He states that he continued with pain and had a second x-ray performed which then showed 4 rib fractures.  Patient states that the pain has significantly improved to his left side.  He does attest to a cough with some wheezing.  He has been using his incentive spirometry, but probably not enough.  Denies any further trauma to site.

## 2018-10-22 NOTE — ED PROVIDER NOTES
Encounter Date: 10/22/2018    SCRIBE #1 NOTE: I, Sukh Galo, am scribing for, and in the presence of,  Dr. Coley. I have scribed the entire note.       History     Chief Complaint   Patient presents with    Back Pain     Patient states he fell in october and was seen and told he has several broken ribs.  Patient has been having coughing and cold and PCP send him to ED for further evaluation.     This is a 58 y.o. male who has a past medical history of ALLERGIC RHINITIS, chronic Bronchial asthma, Hyperlipidemia, Hypertension, and Obesity who presents with chief complaint of continued left upper back pain s/p rib fracture about 20 days ago. Patient was seen in the ED on 10/1 following a fall, and was diagnosed with a single rib fracture on CXR. He followed up with his PCP on 10/19 for continued pain, and was diagnosed with 4 rib fractures on CXR. His pain is worsened with deep breathing or coughing; he notes his cough is due to chronic bronchitis. He also notes pain while laying on his back. Patient denies any recent trauma, urinary/bowel incontinence, numbness/tingling, extremity weakness, difficulty walking, or any other concerning symptoms. He reports some relief with Norco at home, but states his pain returns shortly after.      The history is provided by the patient.     Review of patient's allergies indicates:  No Known Allergies  Past Medical History:   Diagnosis Date    ALLERGIC RHINITIS     Bronchial asthma     Hyperlipemia     Hyperlipidemia     Hypertension     not on medication    Obesity      Past Surgical History:   Procedure Laterality Date    Repair ruptured left biceps  2004     Family History   Problem Relation Age of Onset    Coronary artery disease Father     Heart disease Father     Diabetes Mother     Heart disease Mother     Hypertension Mother     Diabetes Sister     Hypertension Brother      Social History     Tobacco Use    Smoking status: Never Smoker    Smokeless  tobacco: Never Used   Substance Use Topics    Alcohol use: Yes     Alcohol/week: 1.5 oz     Types: 3 Standard drinks or equivalent per week     Comment: 2x/week    Drug use: No     Review of Systems   Constitutional: Negative for chills and fever.   HENT: Negative for facial swelling and trouble swallowing.    Eyes: Negative for redness.   Respiratory: Negative for shortness of breath.    Cardiovascular: Negative for chest pain.   Gastrointestinal: Negative for abdominal pain, diarrhea, nausea and vomiting.        No bowel incontinence   Genitourinary: Negative for decreased urine volume, difficulty urinating, dysuria and hematuria.        No urinary incontinence   Musculoskeletal: Positive for back pain (left upper). Negative for gait problem.   Skin: Negative for rash.   Neurological: Negative for facial asymmetry and speech difficulty.     Physical Exam     Initial Vitals [10/22/18 0943]   BP Pulse Resp Temp SpO2   (!) 159/99 93 16 98.7 °F (37.1 °C) (!) 94 %      MAP       --         Physical Exam    Nursing note and vitals reviewed.  Constitutional: He appears well-developed and well-nourished. He is not diaphoretic. No distress.   HENT:   Head: Normocephalic and atraumatic.   Mouth/Throat: Oropharynx is clear and moist.   Eyes: Conjunctivae and EOM are normal.   Neck: Normal range of motion. Neck supple.   Cardiovascular: Normal rate, regular rhythm and normal heart sounds.   Pulmonary/Chest: Breath sounds normal. No respiratory distress.   Lungs clear to auscultation bilaterally.  No increased work of breathing.     Abdominal: Soft. There is no tenderness.   Musculoskeletal: Normal range of motion. He exhibits tenderness. He exhibits no edema.   TTP inferior to the left scapula  No bruising or deformity noted  No no paradoxical chest wall movement  No lesions, rashes or bruising noted to the left anterior and posterior chest wall  Mild tenderness to the posterior lateral regions of ribs 2 through 5    Neurological: He is alert and oriented to person, place, and time. He has normal strength.   Skin: Skin is warm and dry. Capillary refill takes less than 2 seconds.   Psychiatric: He has a normal mood and affect.       ED Course   Procedures  Labs Reviewed - No data to display       Imaging Results          X-Ray Chest PA And Lateral (Final result)  Result time 10/22/18 10:29:51    Final result by Julian Klein DO (10/22/18 10:29:51)                 Impression:      See above      Electronically signed by: Julian Klein DO  Date:    10/22/2018  Time:    10:29             Narrative:    EXAMINATION:  XR CHEST PA AND LATERAL    CLINICAL HISTORY:  Pleurodynia    TECHNIQUE:  PA and lateral views of the chest were performed.    COMPARISON:  10/19/2018    FINDINGS:  No new lung opacity.  No large pleural effusion or pneumothorax.  Heart size within normal limits.  Previously identified left rib fracture deformities better appreciated on prior rib series..  Clinical correlation and continued follow-up advised                              X-Rays:   Independently Interpreted Readings:   Other Readings:  Reviewed by myself, read by radiology.    Imaging Results          X-Ray Chest PA And Lateral (Final result)  Result time 10/22/18 10:29:51    Final result by Julian Klein DO (10/22/18 10:29:51)                 Impression:      See above      Electronically signed by: Julian Klein DO  Date:    10/22/2018  Time:    10:29             Narrative:    EXAMINATION:  XR CHEST PA AND LATERAL    CLINICAL HISTORY:  Pleurodynia    TECHNIQUE:  PA and lateral views of the chest were performed.    COMPARISON:  10/19/2018    FINDINGS:  No new lung opacity.  No large pleural effusion or pneumothorax.  Heart size within normal limits.  Previously identified left rib fracture deformities better appreciated on prior rib series..  Clinical correlation and continued follow-up advised                              Medical Decision Making:    Clinical Tests:   Radiological Study: Ordered and Reviewed  ED Management:  CXR demonstrates recent fractures with minor displacement at the posterolateral portions of left ribs 2, 3, 4.   In light of patient's underlying pulmonary pathology, difficulty coughing, and pain not being controlled with current pain regimen, I will advocate for admission to observation for further evaluation and management.  Patient administered a breathing treatment in ED with improvement wheezing.  Discussed case with Dr. Forbes who is in agreement with the plan for admission to the observation unit.  Discussed plan for admission with the patient who is in agreement.  All patient questions answered prior to admission.                      Clinical Impression:     1. Closed fracture of multiple ribs of left side, initial encounter    2. Rib pain    3. Asthma, unspecified asthma severity, unspecified whether complicated, unspecified whether persistent      Disposition:   Disposition: Placed in Observation       I, Jorge Coley,  personally performed the services described in this documentation. All medical record entries made by the scribe were at my direction and in my presence.  I have reviewed the chart and agree that the record reflects my personal performance and is accurate and complete. Jorge Coley M.D. 8:39 PM10/22/2018     Jorge Coley MD  10/22/18 2041

## 2018-10-22 NOTE — PROGRESS NOTES
10/22/18 1100   Peak Flow   $ Peak Flow Charges Pocket Peak Flow Meter - Supply   Peak Flow Personal Best (L/Min) 550   Peak Flow Predicted (L/Min) 525   Peak Flow PREtreatment (L/Min) 400   Peak Flow POST-Treatment (L/Min) 550   Peak Flow Effort good   Peak Flow Position Sitting in bed

## 2018-10-22 NOTE — PLAN OF CARE
Problem: Patient Care Overview  Goal: Plan of Care Review  Plan of care reviewed with Pt. Pt refused pain medication. Bed locked and in lowest position, side rails up X2. Call bell and possessions within reach. Instructed pt to notify staff for any assistance. Pt verbalized understanding.

## 2018-10-22 NOTE — H&P
[facility] Medicine  History & Physical    Patient Name: Maxx Lo  MRN: [id number]  Admission Date: 10/22/2018  Attending Physician: Will Forbes MD   Primary Care Provider: Joyce Armando MD         Patient information was obtained from patient and ER records.     Subjective:     Principal Problem:Rib pain    Chief Complaint:   Chief Complaint   Patient presents with    Back Pain     Patient states he fell in october and was seen and told he has several broken ribs.  Patient has been having coughing and cold and PCP send him to ED for further evaluation.        HPI: Maxx Lo, a 58 y.o. male with  has a past medical history of ALLERGIC RHINITIS, Bronchial asthma, Hyperlipemia, Hyperlipidemia, Hypertension, and Obesity.    He  presents to the ED for evaluation of multiple rib fractures.  Patient states that he had a trip and fall on 09/30 to his left side.  He had continued pain to this site and was evaluated by his PCP where an x-ray showed 1 rib fracture.  He was given pain medication and an incentive spirometry after visit.  He states that he continued with pain and had a second x-ray performed which then showed 4 rib fractures.  Patient states that the pain has significantly improved to his left side.  He does attest to a cough with some wheezing.  He has been using his incentive spirometry, but probably not enough.  Denies any further trauma to site.            Past Medical History:   Diagnosis Date    ALLERGIC RHINITIS     Bronchial asthma     Hyperlipemia     Hyperlipidemia     Hypertension     not on medication    Obesity        Past Surgical History:   Procedure Laterality Date    Repair ruptured left biceps  2004       Review of patient's allergies indicates:  No Known Allergies      Family History     Problem Relation (Age of Onset)    Coronary artery disease Father    Diabetes Mother, Sister    Heart disease Father, Mother    Hypertension  Mother, Brother        Tobacco Use    Smoking status: Never Smoker    Smokeless tobacco: Never Used   Substance and Sexual Activity    Alcohol use: Yes     Alcohol/week: 1.5 oz     Types: 3 Standard drinks or equivalent per week     Comment: 2x/week    Drug use: No    Sexual activity: Not on file     Review of Systems   Constitutional: Negative for fever.   HENT: Negative for congestion.    Respiratory: Positive for cough and wheezing. Negative for shortness of breath.    Cardiovascular: Negative for chest pain.   Gastrointestinal: Negative for abdominal pain, nausea and vomiting.   Musculoskeletal: Positive for back pain.        Left sided rib pain    Skin: Negative for color change.   Allergic/Immunologic: Negative for immunocompromised state.   Hematological: Negative for adenopathy.   Psychiatric/Behavioral: Negative for agitation.   All other systems reviewed and are negative.    Objective:     Vital Signs (Most Recent):  Temp: 98.2 °F (36.8 °C) (10/22/18 1556)  Pulse: 87 (10/22/18 1556)  Resp: 20 (10/22/18 1556)  BP: 124/80 (10/22/18 1556)  SpO2: 96 % (10/22/18 1556) Vital Signs (24h Range):  Temp:  [98.1 °F (36.7 °C)-98.7 °F (37.1 °C)] 98.2 °F (36.8 °C)  Pulse:  [] 87  Resp:  [16-20] 20  SpO2:  [93 %-98 %] 96 %  BP: (124-159)/(74-99) 124/80     Weight: 86 kg (189 lb 9.5 oz)  Body mass index is 29.69 kg/m².    Physical Exam   Constitutional: He is oriented to person, place, and time. He appears well-developed and well-nourished.   HENT:   Head: Normocephalic and atraumatic.   Right Ear: External ear normal.   Left Ear: External ear normal.   Nose: Nose normal.   Mouth/Throat: Oropharynx is clear and moist.   Neck: Normal range of motion. Neck supple.   Cardiovascular: Normal rate and regular rhythm.   Pulmonary/Chest: Effort normal. No stridor. No respiratory distress. He has wheezes in the right upper field and the left upper field. He has no rales. He exhibits no tenderness.   Abdominal: Soft.  Bowel sounds are normal. He exhibits no distension and no mass. There is no tenderness. There is no guarding.   Musculoskeletal: Normal range of motion.        Arms:  Neurological: He is alert and oriented to person, place, and time.   Skin: Skin is warm. Capillary refill takes less than 2 seconds.   Psychiatric: He has a normal mood and affect. His behavior is normal.   Nursing note and vitals reviewed.          Significant Labs: All pertinent labs within the past 24 hours have been reviewed.    Significant Imaging: I have reviewed and interpreted all pertinent imaging results/findings within the past 24 hours.    Assessment/Plan:     * Rib pain    PRN pain medication ordered.  Patient instructed to use incentive spirometer 9 times an hour.         Wheeze    Cough  - Prednisone started and breathing treatments ordered Q6H         VTE Risk Mitigation (From admission, onward)    None             Clover Young PA-C  Department of Hospital Medicine   [facility]

## 2018-10-22 NOTE — TELEPHONE ENCOUNTER
Repeat x-ray now shows 4 rib fractures with minor displacement. Recommend he go to ER now. D/w pt, he expressed understanding. All questions answered.

## 2018-10-23 VITALS
HEIGHT: 67 IN | WEIGHT: 189.63 LBS | DIASTOLIC BLOOD PRESSURE: 67 MMHG | HEART RATE: 88 BPM | RESPIRATION RATE: 20 BRPM | BODY MASS INDEX: 29.76 KG/M2 | TEMPERATURE: 98 F | OXYGEN SATURATION: 98 % | SYSTOLIC BLOOD PRESSURE: 145 MMHG

## 2018-10-23 PROBLEM — R06.2 WHEEZE: Status: RESOLVED | Noted: 2018-10-22 | Resolved: 2018-10-23

## 2018-10-23 PROBLEM — R05.9 COUGH: Status: RESOLVED | Noted: 2018-10-22 | Resolved: 2018-10-23

## 2018-10-23 PROCEDURE — G0378 HOSPITAL OBSERVATION PER HR: HCPCS

## 2018-10-23 PROCEDURE — 25000242 PHARM REV CODE 250 ALT 637 W/ HCPCS: Performed by: PHYSICIAN ASSISTANT

## 2018-10-23 PROCEDURE — 90686 IIV4 VACC NO PRSV 0.5 ML IM: CPT | Performed by: NURSE PRACTITIONER

## 2018-10-23 PROCEDURE — 90670 PCV13 VACCINE IM: CPT | Performed by: PHYSICIAN ASSISTANT

## 2018-10-23 PROCEDURE — 90472 IMMUNIZATION ADMIN EACH ADD: CPT | Performed by: NURSE PRACTITIONER

## 2018-10-23 PROCEDURE — 63600175 PHARM REV CODE 636 W HCPCS: Performed by: PHYSICIAN ASSISTANT

## 2018-10-23 PROCEDURE — 90471 IMMUNIZATION ADMIN: CPT | Performed by: PHYSICIAN ASSISTANT

## 2018-10-23 PROCEDURE — 25000003 PHARM REV CODE 250: Performed by: PHYSICIAN ASSISTANT

## 2018-10-23 PROCEDURE — 94640 AIRWAY INHALATION TREATMENT: CPT

## 2018-10-23 PROCEDURE — 94761 N-INVAS EAR/PLS OXIMETRY MLT: CPT

## 2018-10-23 PROCEDURE — 63600175 PHARM REV CODE 636 W HCPCS: Performed by: NURSE PRACTITIONER

## 2018-10-23 RX ORDER — PREDNISONE 20 MG/1
40 TABLET ORAL DAILY
Qty: 6 TABLET | Refills: 0 | Status: SHIPPED | OUTPATIENT
Start: 2018-10-24 | End: 2018-10-27

## 2018-10-23 RX ORDER — PREDNISONE 20 MG/1
40 TABLET ORAL DAILY
Qty: 6 TABLET | Refills: 0 | OUTPATIENT
Start: 2018-10-24 | End: 2018-10-23

## 2018-10-23 RX ORDER — METHOCARBAMOL 500 MG/1
1000 TABLET, FILM COATED ORAL 3 TIMES DAILY
Qty: 15 TABLET | Refills: 0 | OUTPATIENT
Start: 2018-10-23 | End: 2018-10-23 | Stop reason: SDUPTHER

## 2018-10-23 RX ORDER — METHOCARBAMOL 500 MG/1
1000 TABLET, FILM COATED ORAL 3 TIMES DAILY
Qty: 15 TABLET | Refills: 0 | Status: SHIPPED | OUTPATIENT
Start: 2018-10-23 | End: 2018-10-28

## 2018-10-23 RX ADMIN — PNEUMOCOCCAL 13-VALENT CONJUGATE VACCINE 0.5 ML: 2.2; 2.2; 2.2; 2.2; 2.2; 4.4; 2.2; 2.2; 2.2; 2.2; 2.2; 2.2; 2.2 INJECTION, SUSPENSION INTRAMUSCULAR at 10:10

## 2018-10-23 RX ADMIN — INFLUENZA A VIRUS A/MICHIGAN/45/2015 X-275 (H1N1) ANTIGEN (FORMALDEHYDE INACTIVATED), INFLUENZA A VIRUS A/SINGAPORE/INFIMH-16-0019/2016 IVR-186 (H3N2) ANTIGEN (FORMALDEHYDE INACTIVATED), INFLUENZA B VIRUS B/PHUKET/3073/2013 ANTIGEN (FORMALDEHYDE INACTIVATED), AND INFLUENZA B VIRUS B/MARYLAND/15/2016 BX-69A ANTIGEN (FORMALDEHYDE INACTIVATED) 0.5 ML: 15; 15; 15; 15 INJECTION, SUSPENSION INTRAMUSCULAR at 11:10

## 2018-10-23 RX ADMIN — GUAIFENESIN AND DEXTROMETHORPHAN HYDROBROMIDE 1 TABLET: 600; 30 TABLET, EXTENDED RELEASE ORAL at 08:10

## 2018-10-23 RX ADMIN — IPRATROPIUM BROMIDE AND ALBUTEROL SULFATE 3 ML: .5; 3 SOLUTION RESPIRATORY (INHALATION) at 08:10

## 2018-10-23 RX ADMIN — PREDNISONE 40 MG: 20 TABLET ORAL at 08:10

## 2018-10-23 RX ADMIN — IPRATROPIUM BROMIDE AND ALBUTEROL SULFATE 3 ML: .5; 3 SOLUTION RESPIRATORY (INHALATION) at 12:10

## 2018-10-23 NOTE — SUBJECTIVE & OBJECTIVE
Interval History: Pt reports difficulty sleeping due to increased pain while in recumbent position.  He does say that norco does help for a few hours when he takes it.  He feels that he is breathing normally.  Has no SOB.  Pt reports that he would like to be discharged home.     Review of Systems   Constitutional: Negative for activity change, chills and fever.   HENT: Negative for congestion.    Respiratory: Negative for cough, chest tightness and shortness of breath.    Cardiovascular: Negative for chest pain.   Gastrointestinal: Negative for abdominal pain and vomiting.   Musculoskeletal: Negative for back pain, joint swelling, myalgias and neck pain.        Left rib pain     Skin: Negative.    Neurological: Negative for headaches.   Psychiatric/Behavioral: Negative for confusion.   All other systems reviewed and are negative.    Objective:     Vital Signs (Most Recent):  Temp: 96.2 °F (35.7 °C) (10/23/18 0722)  Pulse: 83 (10/23/18 0831)  Resp: 16 (10/23/18 0831)  BP: (!) 157/61 (10/23/18 0722)  SpO2: 96 % (10/23/18 0831) Vital Signs (24h Range):  Temp:  [96.2 °F (35.7 °C)-98.7 °F (37.1 °C)] 96.2 °F (35.7 °C)  Pulse:  [] 83  Resp:  [16-20] 16  SpO2:  [93 %-99 %] 96 %  BP: (124-159)/(61-99) 157/61     Weight: 86 kg (189 lb 9.5 oz)  Body mass index is 29.69 kg/m².  No intake or output data in the 24 hours ending 10/23/18 0926   Physical Exam   Constitutional: He is oriented to person, place, and time. Vital signs are normal. He appears well-developed and well-nourished. He is active and cooperative. He is easily aroused.  Non-toxic appearance. He does not have a sickly appearance. He does not appear ill. No distress.   HENT:   Head: Normocephalic and atraumatic.   Right Ear: External ear normal.   Left Ear: External ear normal.   Nose: Nose normal.   Mouth/Throat: Uvula is midline, oropharynx is clear and moist and mucous membranes are normal.   Eyes: Conjunctivae and lids are normal.   Neck: Normal range of  motion and phonation normal.   Cardiovascular: Normal rate, regular rhythm and normal heart sounds.   Pulmonary/Chest: Effort normal and breath sounds normal. He has no decreased breath sounds. He has no wheezes. He has no rhonchi. He exhibits tenderness (left posterior ribs). He exhibits no crepitus, no deformity, no swelling and no retraction.   Abdominal: Soft. Normal appearance and bowel sounds are normal. He exhibits no distension. There is no tenderness. There is no rigidity, no rebound and no guarding.   Neurological: He is alert, oriented to person, place, and time and easily aroused. He has normal strength. Coordination normal. GCS eye subscore is 4. GCS verbal subscore is 5. GCS motor subscore is 6.   Skin: Skin is warm, dry and intact. Capillary refill takes less than 2 seconds. No abrasion and no bruising noted. No pallor.   Psychiatric: He has a normal mood and affect. His speech is normal and behavior is normal. Judgment and thought content normal.   Nursing note and vitals reviewed.      Significant Labs: CBC: No results for input(s): WBC, HGB, HCT, PLT in the last 48 hours.  CMP: No results for input(s): NA, K, CL, CO2, GLU, BUN, CREATININE, CALCIUM, PROT, ALBUMIN, BILITOT, ALKPHOS, AST, ALT, ANIONGAP, EGFRNONAA in the last 48 hours.    Invalid input(s): ESTGFAFRICA    Significant Imaging: None in 24 hours.

## 2018-10-23 NOTE — DISCHARGE SUMMARY
[facility] Hospital Medicine  Discharge Summary      Patient Name: Maxx Lo  MRN: [id number]  Admission Date: 10/22/2018  Hospital Length of Stay: 1 days  Discharge Date and Time:  10/23/2018 10:15 AM  Attending Physician: Maco Harper MD   Discharging Provider: CORAZON Goldman  Primary Care Provider: Joyce Armando MD      HPI:   Maxx Lo, a 58 y.o. male with  has a past medical history of ALLERGIC RHINITIS, Bronchial asthma, Hyperlipemia, Hyperlipidemia, Hypertension, and Obesity.    He  presents to the ED for evaluation of multiple rib fractures.  Patient states that he had a trip and fall on 09/30 to his left side.  He had continued pain to this site and was evaluated by his PCP where an x-ray showed 1 rib fracture.  He was given pain medication and an incentive spirometry after visit.  He states that he continued with pain and had a second x-ray performed which then showed 4 rib fractures.  Patient states that the pain has significantly improved to his left side.  He does attest to a cough with some wheezing.  He has been using his incentive spirometry, but probably not enough.  Denies any further trauma to site.            * No surgery found *      Hospital Course:   CXR: Left rib series demonstrates  recent fractures with minor displacement at the posterolateral portions of left ribs 2, 3, 4, and 5.  Breathing treatments and prednisone given. Pt was encouraged to use incentive spirometry, which he did appropriately while in CDU.  Pt denied SOB, and felt that his breathing was at his baseline status.  Pt was discharged to home. He will be given prescription for prednisone and robaxin.  Pt has RX albuterol MDI and norco at home.      Consults:     No new Assessment & Plan notes have been filed under this hospital service since the last note was generated.  Service: Hospital Medicine    Final Active Diagnoses:    Diagnosis Date Noted POA     PRINCIPAL PROBLEM:  Rib pain [R07.81] 10/22/2018 Yes      Problems Resolved During this Admission:    Diagnosis Date Noted Date Resolved POA    Wheeze [R06.2] 10/22/2018 10/23/2018 Yes    Cough [R05] 10/22/2018 10/23/2018 Unknown       Discharged Condition: good    Disposition: Home or Self Care    Follow Up:  Follow-up Information     Joyce Armando MD. Go on 10/29/2018.    Specialty:  Internal Medicine  Why:  10:00 am  Contact information:  [address] [facility] [place] [address]  [phone]                 Patient Instructions:      Diet Adult Regular     Notify your health care provider if you experience any of the following:  temperature >100.4     Notify your health care provider if you experience any of the following:  severe uncontrolled pain     Activity as tolerated       Significant Diagnostic Studies: Labs: CMP No results for input(s): NA, K, CL, CO2, GLU, BUN, CREATININE, CALCIUM, PROT, ALBUMIN, BILITOT, ALKPHOS, AST, ALT, ANIONGAP, ESTGFRAFRICA, EGFRNONAA in the last 48 hours., CBC No results for input(s): WBC, HGB, HCT, PLT in the last 48 hours., Troponin No results for input(s): TROPONINI in the last 168 hours. and All labs within the past 24 hours have been reviewed    Pending Diagnostic Studies:     None         Medications:  Reconciled Home Medications:      Medication List      START taking these medications    methocarbamol 500 MG Tab  Commonly known as:  ROBAXIN  Take 2 tablets (1,000 mg total) by mouth 3 (three) times daily. for 5 days     predniSONE 20 MG tablet  Commonly known as:  DELTASONE  Take 2 tablets (40 mg total) by mouth once daily. for 3 days  Start taking on:  10/24/2018        CONTINUE taking these medications    albuterol 90 mcg/actuation inhaler  Commonly known as:  PROAIR HFA  Inhale 1-2 puffs into the lungs every 6 (six) hours as needed for Wheezing or Shortness of Breath. generic ok     atorvastatin 20 MG tablet  Commonly known as:  LIPITOR  TAKE 1 TABLET BY MOUTH EVERY  DAY     HYDROcodone-acetaminophen  mg per tablet  Commonly known as:  NORCO  Take 1 tablet by mouth every 6 (six) hours as needed for Pain.     lidocaine 5 %  Commonly known as:  LIDODERM  Place 1 patch onto the skin once daily.     ONE-A-DAY MEN'S 0.4-600 mg-mcg Tab  Generic drug:  multivit with min-FA-lycopene  Take by mouth. 1 Tablet Oral Every day        STOP taking these medications    UNABLE TO FIND            Indwelling Lines/Drains at time of discharge:   Lines/Drains/Airways          None          Time spent on the discharge of patient: 20   minutes  Patient was seen and examined on the date of discharge and determined to be suitable for discharge.         CORAZON Goldman  Department of Hospital Medicine  [facility]

## 2018-10-23 NOTE — NURSING
Pt given D/C and F/U information. Verbalized understanding. No PIV or telemetry. Pt waiting for ride. NAD noted.

## 2018-10-23 NOTE — PROGRESS NOTES
[facility] Hospital Medicine  Progress Note    Patient Name: Maxx Lo  MRN: [id number]  Patient Class: OP- Observation   Admission Date: 10/22/2018  Length of Stay: 1 days  Attending Physician: Maco Harper MD  Primary Care Provider: Joyce Armando MD        Subjective:     Principal Problem:Rib pain    HPI:  Maxx Lo, a 58 y.o. male with  has a past medical history of ALLERGIC RHINITIS, Bronchial asthma, Hyperlipemia, Hyperlipidemia, Hypertension, and Obesity.    He  presents to the ED for evaluation of multiple rib fractures.  Patient states that he had a trip and fall on 09/30 to his left side.  He had continued pain to this site and was evaluated by his PCP where an x-ray showed 1 rib fracture.  He was given pain medication and an incentive spirometry after visit.  He states that he continued with pain and had a second x-ray performed which then showed 4 rib fractures.  Patient states that the pain has significantly improved to his left side.  He does attest to a cough with some wheezing.  He has been using his incentive spirometry, but probably not enough.  Denies any further trauma to site.            Hospital Course:  CXR: Left rib series demonstrates  recent fractures with minor displacement at the posterolateral portions of left ribs 2, 3, 4, and 5.  Breathing treatment administered.  Pain medication offered, patient declined.      Interval History: Pt reports difficulty sleeping due to increased pain while in recumbent position.  He does say that norco does help for a few hours when he takes it.  He feels that he is breathing normally.  Has no SOB.  Pt reports that he would like to be discharged home.     Review of Systems   Constitutional: Negative for activity change, chills and fever.   HENT: Negative for congestion.    Respiratory: Negative for cough, chest tightness and shortness of breath.    Cardiovascular: Negative for chest pain.    Gastrointestinal: Negative for abdominal pain and vomiting.   Musculoskeletal: Negative for back pain, joint swelling, myalgias and neck pain.        Left rib pain     Skin: Negative.    Neurological: Negative for headaches.   Psychiatric/Behavioral: Negative for confusion.   All other systems reviewed and are negative.    Objective:     Vital Signs (Most Recent):  Temp: 96.2 °F (35.7 °C) (10/23/18 0722)  Pulse: 83 (10/23/18 0831)  Resp: 16 (10/23/18 0831)  BP: (!) 157/61 (10/23/18 0722)  SpO2: 96 % (10/23/18 0831) Vital Signs (24h Range):  Temp:  [96.2 °F (35.7 °C)-98.7 °F (37.1 °C)] 96.2 °F (35.7 °C)  Pulse:  [] 83  Resp:  [16-20] 16  SpO2:  [93 %-99 %] 96 %  BP: (124-159)/(61-99) 157/61     Weight: 86 kg (189 lb 9.5 oz)  Body mass index is 29.69 kg/m².  No intake or output data in the 24 hours ending 10/23/18 0926   Physical Exam   Constitutional: He is oriented to person, place, and time. Vital signs are normal. He appears well-developed and well-nourished. He is active and cooperative. He is easily aroused.  Non-toxic appearance. He does not have a sickly appearance. He does not appear ill. No distress.   HENT:   Head: Normocephalic and atraumatic.   Right Ear: External ear normal.   Left Ear: External ear normal.   Nose: Nose normal.   Mouth/Throat: Uvula is midline, oropharynx is clear and moist and mucous membranes are normal.   Eyes: Conjunctivae and lids are normal.   Neck: Normal range of motion and phonation normal.   Cardiovascular: Normal rate, regular rhythm and normal heart sounds.   Pulmonary/Chest: Effort normal and breath sounds normal. He has no decreased breath sounds. He has no wheezes. He has no rhonchi. He exhibits tenderness (left posterior ribs). He exhibits no crepitus, no deformity, no swelling and no retraction.   Abdominal: Soft. Normal appearance and bowel sounds are normal. He exhibits no distension. There is no tenderness. There is no rigidity, no rebound and no guarding.    Neurological: He is alert, oriented to person, place, and time and easily aroused. He has normal strength. Coordination normal. GCS eye subscore is 4. GCS verbal subscore is 5. GCS motor subscore is 6.   Skin: Skin is warm, dry and intact. Capillary refill takes less than 2 seconds. No abrasion and no bruising noted. No pallor.   Psychiatric: He has a normal mood and affect. His speech is normal and behavior is normal. Judgment and thought content normal.   Nursing note and vitals reviewed.      Significant Labs: CBC: No results for input(s): WBC, HGB, HCT, PLT in the last 48 hours.  CMP: No results for input(s): NA, K, CL, CO2, GLU, BUN, CREATININE, CALCIUM, PROT, ALBUMIN, BILITOT, ALKPHOS, AST, ALT, ANIONGAP, EGFRNONAA in the last 48 hours.    Invalid input(s): ESTGFAFRICA    Significant Imaging: None in 24 hours.     Assessment/Plan:      * Rib pain    PRN pain medication ordered.  Has RX Norco.  Patient using IS and nebulizer treatments.  Feels at baseline respiratory status.  Denies SOB.   Likely DC this AM.        Wheeze    Cough  - Prednisone 40mg daily.   -Pt has MDI Albuterol at home.   -RX Prednisone          VTE Risk Mitigation (From admission, onward)    None              CORAZON Goldman  Department of Hospital Medicine   [facility]

## 2018-10-23 NOTE — ASSESSMENT & PLAN NOTE
PRN pain medication ordered.  Has RX Norco.  Patient using IS and nebulizer treatments.  Feels at baseline respiratory status.  Denies SOB.   Likely DC this AM.

## 2018-10-23 NOTE — PLAN OF CARE
Future Appointments   Date Time Provider Department Center   10/29/2018 10:00 AM Joyce Armando MD [facility]        10/23/18 1032   Discharge Assessment   Assessment Type Discharge Planning Assessment   Confirmed/corrected address and phone number on facesheet? Yes   Assessment information obtained from? Patient   Prior to hospitilization cognitive status: Alert/Oriented   Prior to hospitalization functional status: Independent   Current cognitive status: Alert/Oriented   Current Functional Status: Independent   Lives With alone   Able to Return to Prior Arrangements no   Is patient able to care for self after discharge? No   Patient's perception of discharge disposition home or selfcare   Readmission Within The Last 30 Days no previous admission in last 30 days   Patient currently being followed by outpatient case management? No   Patient currently receives any other outside agency services? No   Equipment Currently Used at Home none   Do you have any problems affording any of your prescribed medications? No   Is the patient taking medications as prescribed? yes   Does the patient have transportation home? Yes   Transportation Available none   Does the patient receive services at the [facility]? No   Discharge Plan A Home;Home with family   Discharge Plan B Home;Home with family   Patient/Family In Agreement With Plan yes

## 2018-10-29 ENCOUNTER — OFFICE VISIT (OUTPATIENT)
Dept: INTERNAL MEDICINE | Facility: CLINIC | Age: 58
End: 2018-10-29
Payer: COMMERCIAL

## 2018-10-29 ENCOUNTER — TELEPHONE (OUTPATIENT)
Dept: INTERNAL MEDICINE | Facility: CLINIC | Age: 58
End: 2018-10-29

## 2018-10-29 ENCOUNTER — HOSPITAL ENCOUNTER (OUTPATIENT)
Dept: RADIOLOGY | Facility: HOSPITAL | Age: 58
Discharge: HOME | End: 2018-10-29
Attending: INTERNAL MEDICINE
Payer: COMMERCIAL

## 2018-10-29 VITALS
HEART RATE: 67 BPM | HEIGHT: 67 IN | BODY MASS INDEX: 32.21 KG/M2 | SYSTOLIC BLOOD PRESSURE: 140 MMHG | DIASTOLIC BLOOD PRESSURE: 90 MMHG | WEIGHT: 205.25 LBS | RESPIRATION RATE: 16 BRPM | TEMPERATURE: 98 F

## 2018-10-29 DIAGNOSIS — S22.42XD CLOSED FRACTURE OF MULTIPLE RIBS OF LEFT SIDE WITH ROUTINE HEALING, SUBSEQUENT ENCOUNTER: ICD-10-CM

## 2018-10-29 DIAGNOSIS — I10 ESSENTIAL HYPERTENSION: ICD-10-CM

## 2018-10-29 DIAGNOSIS — R07.81 RIB PAIN: ICD-10-CM

## 2018-10-29 DIAGNOSIS — Z09 HOSPITAL DISCHARGE FOLLOW-UP: Primary | ICD-10-CM

## 2018-10-29 DIAGNOSIS — J40 BRONCHITIS: ICD-10-CM

## 2018-10-29 PROCEDURE — 3080F DIAST BP >= 90 MM HG: CPT | Mod: CPTII,S$GLB,, | Performed by: INTERNAL MEDICINE

## 2018-10-29 PROCEDURE — 99999 PR PBB SHADOW E&M-EST. PATIENT-LVL IV: CPT | Mod: PBBFAC,,, | Performed by: INTERNAL MEDICINE

## 2018-10-29 PROCEDURE — 71100 X-RAY EXAM RIBS UNI 2 VIEWS: CPT | Mod: TC,PO

## 2018-10-29 PROCEDURE — 71046 X-RAY EXAM CHEST 2 VIEWS: CPT | Mod: 26,,, | Performed by: RADIOLOGY

## 2018-10-29 PROCEDURE — 3008F BODY MASS INDEX DOCD: CPT | Mod: CPTII,S$GLB,, | Performed by: INTERNAL MEDICINE

## 2018-10-29 PROCEDURE — 3074F SYST BP LT 130 MM HG: CPT | Mod: CPTII,S$GLB,, | Performed by: INTERNAL MEDICINE

## 2018-10-29 PROCEDURE — 99215 OFFICE O/P EST HI 40 MIN: CPT | Mod: S$GLB,,, | Performed by: INTERNAL MEDICINE

## 2018-10-29 PROCEDURE — 71100 X-RAY EXAM RIBS UNI 2 VIEWS: CPT | Mod: 26,,, | Performed by: RADIOLOGY

## 2018-10-29 PROCEDURE — 71046 X-RAY EXAM CHEST 2 VIEWS: CPT | Mod: TC,PO

## 2018-10-29 RX ORDER — AMLODIPINE BESYLATE 5 MG/1
5 TABLET ORAL DAILY
Qty: 30 TABLET | Refills: 11 | Status: SHIPPED | OUTPATIENT
Start: 2018-10-29 | End: 2018-11-26 | Stop reason: SDUPTHER

## 2018-10-29 NOTE — PROGRESS NOTES
"Subjective:       Patient ID: Maxx Lo is a 58 y.o. male.    Chief Complaint: Follow-up    HPI    He presents for hospital follow up. He was instructed to go to ER on 10/22/18 as x-ray showed 4 rib fractures and he reported dyspnea, whereas he only had one rib fracture at initial ER visit. He was admitted to observation to monitor his respiratory status and optimize pain control. He was given prednisone for bronchitis.    Pain improved significantly. He was able to go for a walk yesterday, even cut his grass. He reports he still has shallow breathing, but is no longer painful. He reports having gas pain yesterday, but this has since resolved. He did have a BM yesterday. He denies hematuria or dysuria.    He needs completion of [facility] paperwork as it was previously completed with diagnosis of single rib fracture, however repeat imaging showed 4 rib fractures.  Review of Systems   Constitutional: Negative for chills and fever.   Respiratory: Positive for cough. Negative for shortness of breath.    Cardiovascular: Negative for chest pain.   Gastrointestinal: Positive for abdominal pain. Negative for blood in stool.   Musculoskeletal: Positive for back pain.   Skin: Negative for rash.       Objective:        Vitals:    10/29/18 0942 10/29/18 0956   BP: (!) 138/95 (!) 140/90   Pulse: 67    Resp: 16    Temp: 98.3 °F (36.8 °C)    TempSrc: Oral    Weight: 93.1 kg (205 lb 4 oz)    Height: 5' 7" (1.702 m)        Body mass index is 32.15 kg/m².    Physical Exam   Constitutional: He is oriented to person, place, and time. He appears well-developed and well-nourished. No distress.   HENT:   Head: Normocephalic and atraumatic.   Nose: Nose normal.   Eyes: Conjunctivae and EOM are normal. Right eye exhibits no discharge. Left eye exhibits no discharge.   Neck: Normal range of motion. Neck supple.   Cardiovascular: Normal rate, regular rhythm, normal heart sounds and intact distal pulses.   Pulmonary/Chest: Effort normal. " He has wheezes.   Abdominal: Soft. Bowel sounds are normal. He exhibits no distension. There is no tenderness.   Musculoskeletal: Normal range of motion. He exhibits no edema.   Neurological: He is alert and oriented to person, place, and time.   Skin: Skin is warm and dry. He is not diaphoretic. No erythema.   Psychiatric: He has a normal mood and affect. His behavior is normal. Thought content normal.       Assessment:     1. Hospital discharge follow-up    2. Rib pain    3. Essential hypertension    4. Bronchitis    5. Closed fracture of multiple ribs of left side with routine healing, subsequent encounter           Plan:     1. Hospital discharge follow-up  - doing well. Dyspnea resolved. Pain significantly improved.     2. Rib pain  - continue current meds prn    3. Essential hypertension  - Pt instructed to monitor home BP. RTC 4 weeks with BP log.  - information about HTN and [facility] digital monitoring program provided w/ AVS  - amLODIPine (NORVASC) 5 MG tablet; Take 1 tablet (5 mg total) by mouth once daily.  Dispense: 30 tablet; Refill: 11    4. Bronchitis  - continue albuterol inhaler prn  - cont use of incentive spirometer    5. Closed fracture of multiple ribs of left side with routine healing, subsequent encounter  - X-Ray Chest PA And Lateral; Future  - X-Ray Ribs 2 View Left; Future      Pt will be notified when [facility] paperwork completed and ready to pick-up.     Patient note was created using MModal Dictation.  Any errors in syntax or even information may not have been identified and edited on initial review prior to signing this note.

## 2018-10-29 NOTE — TELEPHONE ENCOUNTER
----- Message from Joyce Armando MD sent at 10/29/2018  4:42 PM CDT -----  Rib fractures no longer displaced. Lungs normal - no pneumonia

## 2018-10-29 NOTE — PATIENT INSTRUCTIONS
HYPERTENSION (High Blood Pressure)  High blood pressure is one of the silent killers.  It usually causes no symptoms, but it can cause serious problems if left untreated--such as stroke, heart failure, heart attack, vision problems and kidney failure.    Lifestyle Changes are FUNDAMENTAL!  Lower your salt intake.  Ideally <2g (2000mg) per day.  Don't smoke cigarettes or use any tobacco product.  Lose weight if you're overweight.  Exercise >30 min per day most days of the week  Eat a healthy diet that includes lots of fruits and vegetables and is low in fat.  Limit your alcohol and caffeine intake.    [email or web address]    http://www.[email or web address]/HEARTORG/HealthyLiving/HealthyEating/Nutrition/The-American-Heart-Associations-Diet-and-Lifestyle-Recommendations_[facility]_305855_Article.jsp#.Ws-yrPmnGpo    Blood pressure monitor  [employer] brand monitors are consistently good, but whichever brand you get, I recommend the arm monitor, not the wrist kind (tend to be inaccurate).  Available in any local drugstore.      Monitoring Your Blood Pressure    Both numbers are important, so if one number is too high, we should adjust your medication.  Keep a log of the pressures and what time of day you took them.  Even if you've been doing well for a while, still check maybe once a week just to make sure since changes can occur at any time.      http://www.[email or web address]/HEARTORG/Conditions/HighBloodPressure/KnowYourNumbers/Understanding-Blood-Pressure-Readings_[facility]_301764_Article.jsp#.Ws-wmvmnGpo     Don't Forget Your Eye Exam!  *Make sure to get a dilated Eye Exam yearly to check for any damage from high blood pressure.  Don't wait till you have vision problems, or it may be much harder or even impossible to treat at that point!        Let me know if you would be interested in [facility] digital HTN program. You can find out more information here: [email or web address]

## 2018-11-08 ENCOUNTER — TELEPHONE (OUTPATIENT)
Dept: INTERNAL MEDICINE | Facility: CLINIC | Age: 58
End: 2018-11-08

## 2018-11-08 ENCOUNTER — OFFICE VISIT (OUTPATIENT)
Dept: INTERNAL MEDICINE | Facility: CLINIC | Age: 58
End: 2018-11-08
Payer: COMMERCIAL

## 2018-11-08 VITALS
OXYGEN SATURATION: 95 % | BODY MASS INDEX: 32.15 KG/M2 | RESPIRATION RATE: 18 BRPM | HEART RATE: 74 BPM | HEIGHT: 67 IN | TEMPERATURE: 98 F | WEIGHT: 204.81 LBS | SYSTOLIC BLOOD PRESSURE: 124 MMHG | DIASTOLIC BLOOD PRESSURE: 86 MMHG

## 2018-11-08 DIAGNOSIS — I10 HYPERTENSION, UNSPECIFIED TYPE: Primary | ICD-10-CM

## 2018-11-08 DIAGNOSIS — L23.7 POISON IVY: ICD-10-CM

## 2018-11-08 DIAGNOSIS — S22.42XD CLOSED FRACTURE OF MULTIPLE RIBS OF LEFT SIDE WITH ROUTINE HEALING: ICD-10-CM

## 2018-11-08 DIAGNOSIS — J45.909 ASTHMA, UNSPECIFIED ASTHMA SEVERITY, UNSPECIFIED WHETHER COMPLICATED, UNSPECIFIED WHETHER PERSISTENT: ICD-10-CM

## 2018-11-08 PROCEDURE — 99214 OFFICE O/P EST MOD 30 MIN: CPT | Mod: S$GLB,,, | Performed by: INTERNAL MEDICINE

## 2018-11-08 PROCEDURE — 99999 PR PBB SHADOW E&M-EST. PATIENT-LVL IV: CPT | Mod: PBBFAC,,, | Performed by: INTERNAL MEDICINE

## 2018-11-08 PROCEDURE — 3008F BODY MASS INDEX DOCD: CPT | Mod: CPTII,S$GLB,, | Performed by: INTERNAL MEDICINE

## 2018-11-08 PROCEDURE — 3074F SYST BP LT 130 MM HG: CPT | Mod: CPTII,S$GLB,, | Performed by: INTERNAL MEDICINE

## 2018-11-08 PROCEDURE — 3079F DIAST BP 80-89 MM HG: CPT | Mod: CPTII,S$GLB,, | Performed by: INTERNAL MEDICINE

## 2018-11-08 RX ORDER — TRIAMCINOLONE ACETONIDE 1 MG/G
CREAM TOPICAL 2 TIMES DAILY
Qty: 45 G | Refills: 0 | Status: SHIPPED | OUTPATIENT
Start: 2018-11-08 | End: 2022-12-06

## 2018-11-08 RX ORDER — FLUTICASONE PROPIONATE 220 UG/1
1 AEROSOL, METERED RESPIRATORY (INHALATION) 2 TIMES DAILY
Qty: 12 G | Refills: 1 | Status: SHIPPED | OUTPATIENT
Start: 2018-11-08

## 2018-11-08 NOTE — TELEPHONE ENCOUNTER
----- Message from Sia Ho sent at 11/8/2018  7:44 AM CST -----  Contact: patient [phone] cell   Pt started new bp medication 1 week ago/ doesn't remember the name of the medicine/ and said that his bp readings have been elevated. Please call him to discuss.    All readings are from this morning when he woke upwith abdominal discomfort/ a little nauseous/ this happened off and on.    2:30 am 159/105 pt woke up with pain/nausea  4:10 am  164/103  6 am        130/85

## 2018-11-08 NOTE — PROGRESS NOTES
"Subjective:       Patient ID: Maxx Lo is a 58 y.o. male.    Chief Complaint: Abdominal Pain; Hypertension; and Nausea    HPI    58 y.o. male presents for follow up of chronic medical problems including HTN and new complaint of abdominal pain and nausea.    HTN - Patient is currently on amlodipine 5mg daily. He does check his BP at home, and it runs 120s-150s/80s-90s. Side effects of medications note: none. Of note, today BP [id number] on manual check by me and his BP on his automatic machine was 137/90. His BP cuff is smaller than clinic BP cuff.    He reports abdominal discomfort woke him up from sleep last night. He reports it feels as though he ate some bad food. He ate hamburger and potatoes at a friend's house last night, but the friend thought the potatoes tasted bad.The abdominal pain and nausea are now both resolved.     He also reports a rash due to poison ivy. He has been applying a cream otc, which helps with symptoms but doesn't resolve the rash. On exam, he frequently touches skin of non-affected areas after touching the rash.    Review of Systems   Constitutional: Negative for chills and fever.   Respiratory: Positive for cough and wheezing.    Cardiovascular: Negative for chest pain.   Gastrointestinal: Positive for abdominal pain and nausea. Negative for blood in stool and vomiting.   Genitourinary: Negative for difficulty urinating.   Musculoskeletal: Positive for back pain (rib pain improving).   Skin: Positive for rash (poison ivy).   Neurological: Negative for weakness and headaches.       Objective:        Vitals:    11/08/18 1028   BP: 124/86   Pulse: 74   Resp: 18   Temp: 98.2 °F (36.8 °C)   SpO2: 95%   Weight: 92.9 kg (204 lb 12.9 oz)   Height: 5' 7" (1.702 m)       Body mass index is 32.08 kg/m².    Physical Exam   Constitutional: He is oriented to person, place, and time. He appears well-developed and well-nourished. No distress.   HENT:   Head: Normocephalic and atraumatic. "   Nose: Nose normal.   Eyes: Conjunctivae and EOM are normal. Right eye exhibits no discharge. Left eye exhibits no discharge.   Neck: Normal range of motion. Neck supple.   Cardiovascular: Normal rate, regular rhythm, normal heart sounds and intact distal pulses.   Pulmonary/Chest: Effort normal. No tachypnea. No respiratory distress. He has wheezes in the right lower field and the left lower field.   Abdominal: Soft. Bowel sounds are normal. He exhibits no distension. There is no tenderness. There is no rebound and no guarding.   Musculoskeletal: He exhibits no edema.   Neurological: He is alert and oriented to person, place, and time.   Skin: Skin is warm and dry. Rash noted. He is not diaphoretic.   Psychiatric: He has a normal mood and affect. His behavior is normal. Thought content normal.       Assessment:     1. Hypertension, unspecified type    2. Closed fracture of multiple ribs of left side with routine healing    3. Asthma, unspecified asthma severity, unspecified whether complicated, unspecified whether persistent    4. Poison ivy           Plan:         1. Hypertension, unspecified type  - continue amlodipine 5mg daily  - buy bigger BP cuff, continue to monitor home BP readings  - keep upcoming f/u appt- will reassess BP and lung sounds    2. Closed fracture of multiple ribs of left side with routine healing  - pain improving, he returned to work with light duty.    3. Asthma, unspecified asthma severity, unspecified whether complicated, unspecified whether persistent  - continue albuterol prn  - keep upcoming f/u appt for HTN- will reassess lung sounds after starting controller inhaler. Consider CT lungs, PFTs if s/s continue.  - fluticasone (FLOVENT HFA) 220 mcg/actuation inhaler; Inhale 1 puff into the lungs 2 (two) times daily. Controller  Dispense: 12 g; Refill: 1    4. Poison ivy  - stop touching non-affected skin after touching rash. Wash hands often.   - triamcinolone acetonide 0.1% (KENALOG)  0.1 % cream; Apply topically 2 (two) times daily. for 7 days  Dispense: 45 g; Refill: 0           Patient note was created using MModal Dictation.  Any errors in syntax or even information may not have been identified and edited on initial review prior to signing this note.

## 2018-11-08 NOTE — TELEPHONE ENCOUNTER
Patient stated that he woke up with abdominal pain and nausea, it started last night. Patient check blood pressure and it was elevated. I schedule an appointment for this morning at 10:40 am with Dr. Armando for evaluation.

## 2018-11-14 ENCOUNTER — OFFICE VISIT (OUTPATIENT)
Dept: CARDIOLOGY | Facility: CLINIC | Age: 58
End: 2018-11-14
Payer: COMMERCIAL

## 2018-11-14 VITALS
DIASTOLIC BLOOD PRESSURE: 80 MMHG | WEIGHT: 207 LBS | BODY MASS INDEX: 32.49 KG/M2 | SYSTOLIC BLOOD PRESSURE: 120 MMHG | HEART RATE: 57 BPM | HEIGHT: 67 IN

## 2018-11-14 DIAGNOSIS — G47.19 EXCESSIVE DAYTIME SLEEPINESS: ICD-10-CM

## 2018-11-14 DIAGNOSIS — E78.5 HYPERLIPIDEMIA, UNSPECIFIED HYPERLIPIDEMIA TYPE: ICD-10-CM

## 2018-11-14 DIAGNOSIS — I10 ESSENTIAL HYPERTENSION: ICD-10-CM

## 2018-11-14 DIAGNOSIS — E66.9 CLASS 1 OBESITY WITHOUT SERIOUS COMORBIDITY WITH BODY MASS INDEX (BMI) OF 32.0 TO 32.9 IN ADULT, UNSPECIFIED OBESITY TYPE: ICD-10-CM

## 2018-11-14 DIAGNOSIS — R00.2 PALPITATIONS: ICD-10-CM

## 2018-11-14 DIAGNOSIS — R07.9 CHEST PAIN AT REST: Primary | ICD-10-CM

## 2018-11-14 PROCEDURE — 99999 PR PBB SHADOW E&M-EST. PATIENT-LVL IV: CPT | Mod: PBBFAC,,, | Performed by: INTERNAL MEDICINE

## 2018-11-14 PROCEDURE — 3074F SYST BP LT 130 MM HG: CPT | Mod: CPTII,S$GLB,, | Performed by: INTERNAL MEDICINE

## 2018-11-14 PROCEDURE — 99204 OFFICE O/P NEW MOD 45 MIN: CPT | Mod: S$GLB,,, | Performed by: INTERNAL MEDICINE

## 2018-11-14 PROCEDURE — 3008F BODY MASS INDEX DOCD: CPT | Mod: CPTII,S$GLB,, | Performed by: INTERNAL MEDICINE

## 2018-11-14 PROCEDURE — 93000 ELECTROCARDIOGRAM COMPLETE: CPT | Mod: S$GLB,,, | Performed by: INTERNAL MEDICINE

## 2018-11-14 PROCEDURE — 3079F DIAST BP 80-89 MM HG: CPT | Mod: CPTII,S$GLB,, | Performed by: INTERNAL MEDICINE

## 2018-11-14 RX ORDER — ATORVASTATIN CALCIUM 40 MG/1
40 TABLET, FILM COATED ORAL DAILY
Qty: 90 TABLET | Refills: 3 | Status: SHIPPED | OUTPATIENT
Start: 2018-11-14 | End: 2018-11-14 | Stop reason: SDUPTHER

## 2018-11-14 RX ORDER — ASPIRIN 81 MG/1
81 TABLET ORAL DAILY
COMMUNITY
End: 2022-12-06

## 2018-11-14 RX ORDER — ATORVASTATIN CALCIUM 40 MG/1
40 TABLET, FILM COATED ORAL DAILY
Qty: 90 TABLET | Refills: 3 | Status: SHIPPED | OUTPATIENT
Start: 2018-11-14 | End: 2019-11-08 | Stop reason: SDUPTHER

## 2018-11-14 NOTE — PROGRESS NOTES
Subjective:   Patient ID:  Maxx Lo is a 58 y.o. male who presents for evaluation of Chest Pain      HISTORY  Maxx Lo presents for evaluation of chest pain.a week ago the patient was awakened with a lower substernal chest tightness that lasted for 2 hours. Recurred the following night. No exertional discomfort but Maxx Lo is not exercising due to rib fractures. Maxx Lo has hypertension with BP mostly in the 120s/ at home. Maxx Lo denies  shortness of breath, palpitations, presyncope , or syncope.  Maxx Lo has dyslipidemia  on moderate intensity statin therapy with LDL above 100. Mother and father had CAD in their 60s and 70s..  Review of Systems   Constitution: Negative for weakness, malaise/fatigue, weight gain and weight loss.   Eyes: Negative for blurred vision.   Cardiovascular: Positive for chest pain. Negative for claudication, cyanosis, dyspnea on exertion, irregular heartbeat, leg swelling, near-syncope, orthopnea, palpitations, paroxysmal nocturnal dyspnea and syncope.   Respiratory: Negative for cough, shortness of breath and wheezing.    Musculoskeletal: Negative for falls and myalgias.   Gastrointestinal: Positive for nausea. Negative for abdominal pain, heartburn and vomiting.   Genitourinary: Negative for nocturia.   Neurological: Positive for excessive daytime sleepiness. Negative for brief paralysis, dizziness, focal weakness, headaches, numbness and paresthesias.   Psychiatric/Behavioral: Negative for altered mental status.       Current Outpatient Medications   Medication Sig    albuterol (PROAIR HFA) 90 mcg/actuation inhaler Inhale 1-2 puffs into the lungs every 6 (six) hours as needed for Wheezing or Shortness of Breath. generic ok    amLODIPine (NORVASC) 5 MG tablet Take 1 tablet (5 mg total) by mouth once daily.    aspirin (ECOTRIN) 81 MG EC tablet Take 81 mg by mouth once daily.    atorvastatin (LIPITOR) 40 MG  "tablet Take 1 tablet (40 mg total) by mouth once daily.    fluticasone (FLOVENT HFA) 220 mcg/actuation inhaler Inhale 1 puff into the lungs 2 (two) times daily. Controller    multivit with min-FA-lycopene (ONE-A-DAY MEN'S) 0.4-600 mg-mcg Tab Take by mouth. 1 Tablet Oral Every day    triamcinolone acetonide 0.1% (KENALOG) 0.1 % cream Apply topically 2 (two) times daily. for 7 days     No current facility-administered medications for this visit.      Objective:   Physical Exam   Constitutional: He is oriented to person, place, and time. He appears well-developed. No distress.   /80   Pulse (!) 57   Ht 5' 7" (1.702 m)   Wt 93.9 kg (207 lb 0.2 oz)   BMI 32.42 kg/m²    HENT:   Head: Normocephalic.   Right Ear: External ear normal.   Left Ear: External ear normal.   Eyes: EOM are normal. Pupils are equal, round, and reactive to light. No scleral icterus.   Neck: Neck supple. No JVD present. No thyromegaly present.   Cardiovascular: Normal rate, regular rhythm, normal heart sounds and intact distal pulses.  Occasional extrasystoles are present. PMI is not displaced. Exam reveals no gallop and no friction rub.   No murmur heard.  Pulmonary/Chest: Effort normal and breath sounds normal. No respiratory distress. He has no wheezes. He has no rales.   Abdominal: Soft. He exhibits no distension. There is no hepatosplenomegaly. There is no tenderness.   Musculoskeletal: He exhibits no edema or tenderness.   Gait normal   Neurological: He is alert and oriented to person, place, and time.   Skin: Skin is warm and dry. No rash noted.   Psychiatric: He has a normal mood and affect. His behavior is normal.       Lab Results   Component Value Date     03/15/2018    K 4.8 03/15/2018     03/15/2018    CO2 26 03/15/2018    BUN 16 03/15/2018    CREATININE 1.0 03/15/2018    GLU 99 03/15/2018    HGBA1C 5.4 03/15/2018    AST 29 03/15/2018    ALT 27 03/15/2018    ALBUMIN 3.7 03/15/2018    PROT 7.3 03/15/2018    BILITOT " 0.5 03/15/2018    WBC 8.19 03/15/2018    HGB 16.1 03/15/2018    HCT 45.2 03/15/2018     (H) 03/15/2018     03/15/2018    TSH 3.492 03/15/2018    CHOL 181 03/15/2018    HDL 54 03/15/2018    LDLCALC 110.8 03/15/2018    TRIG 81 03/15/2018       Assessment:     1. Chest pain at rest : atypical for myocardial ischemia but must exclude . Possible GERD   2. Essential hypertension : Currently adequately controlled   3. Hyperlipidemia, unspecified hyperlipidemia type :  on moderate intensity statin therapy with LDL > 100   4. Class 1 obesity without serious comorbidity with body mass index (BMI) of 32.0 to 32.9 in adult, unspecified obesity type : Not central   5. Excessive daytime sleepiness : Probable JENNIFER       Plan:     Maxx was seen today for hypertension.    Diagnoses and all orders for this visit:    Chest pain at rest  -     Stress test with myocardial perfusion (Cupid Only); Future    Essential hypertension  Continue current regimen  Discussed sodium restriction   Hyperlipidemia, unspecified hyperlipidemia type  -   Mediterranean Diet recommended with carbohydrate and sodium restriction  -     Lipid panel; Future; Expected date: 01/13/2019  -     atorvastatin (LIPITOR) 40 MG tablet; Take 1 tablet (40 mg total) by mouth once daily 9 an increase ).    Class 1 obesity without serious comorbidity with body mass index (BMI) of 32.0 to 32.9 in adult, unspecified obesity type  If stress test negative, Graded exercise for 30 minutes a day at least 5 days a week suggested.  Excessive daytime sleepiness  -     Ambulatory consult to Sleep Disorders    Other orders  -     aspirin (ECOTRIN) 81 MG EC tablet; Take 81 mg by mouth once daily.

## 2018-11-14 NOTE — PATIENT INSTRUCTIONS
.Mediter[place] diet recommended  If stress test negative begin Graded exercise for 30 minutes a day at least 5 days a week recommended.

## 2018-11-15 ENCOUNTER — TELEPHONE (OUTPATIENT)
Dept: CARDIOLOGY | Facility: CLINIC | Age: 58
End: 2018-11-15

## 2018-11-15 DIAGNOSIS — R00.2 PALPITATIONS: Primary | ICD-10-CM

## 2018-11-15 NOTE — TELEPHONE ENCOUNTER
----- Message from Carmelina Frederick sent at 11/15/2018  9:15 AM CST -----  Regarding: EKG ORDER  Please place and link EKG order to the EKG or Doctor appointment scheduled on  11/14/18 thanks. Carmelina [phone]

## 2018-11-21 ENCOUNTER — CLINICAL SUPPORT (OUTPATIENT)
Dept: CARDIOLOGY | Facility: CLINIC | Age: 58
End: 2018-11-21
Attending: INTERNAL MEDICINE
Payer: COMMERCIAL

## 2018-11-21 VITALS
BODY MASS INDEX: 32.49 KG/M2 | WEIGHT: 207 LBS | WEIGHT: 207 LBS | BODY MASS INDEX: 32.49 KG/M2 | HEIGHT: 67 IN | HEIGHT: 67 IN

## 2018-11-21 DIAGNOSIS — R07.9 CHEST PAIN AT REST: ICD-10-CM

## 2018-11-21 LAB
CV STRESS BASE HR: 74
DIASTOLIC BLOOD PRESSURE: 91
NUC REST DIASTOLIC VOLUME INDEX: 117
NUC REST EJECTION FRACTION: 59
NUC REST SYSTOLIC VOLUME INDEX: 49
OHS CV CPX 1 MINUTE RECOVERY HEART RATE: 113 BPM
OHS CV CPX 85 PERCENT MAX PREDICTED HEART RATE MALE: 139
OHS CV CPX ESTIMATED METS: 12
OHS CV CPX MAX PREDICTED HEART RATE: 163
OHS CV CPX PATIENT IS FEMALE: 0
OHS CV CPX PATIENT IS MALE: 1
OHS CV CPX PEAK DIASTOLIC BLOOD PRESSURE: 82 MMHG
OHS CV CPX PEAK HEAR RATE: 126
OHS CV CPX PEAK RATE PRESSURE PRODUCT: NORMAL
OHS CV CPX PEAK SYSTOLIC BLOOD PRESSURE: 197
OHS CV CPX PERCENT MAX PREDICTED HEART RATE ACHIEVED: 77
OHS CV CPX PERCENT TARGET HEART RATE ACHIEVED: 86.3
OHS CV CPX RATE PRESSURE PRODUCT PRESENTING: 9472
OHS CV CPX TARGET HEART RATE: 146
STRESS ECHO POST EXERCISE DUR MIN: 7 MIN
STRESS ECHO POST EXERCISE DUR SEC: 38
SYSTOLIC BLOOD PRESSURE: 128

## 2018-11-21 PROCEDURE — 99999 PR PBB SHADOW E&M-EST. PATIENT-LVL II: CPT | Mod: PBBFAC,,,

## 2018-11-21 PROCEDURE — 78452 HT MUSCLE IMAGE SPECT MULT: CPT | Mod: 26,,, | Performed by: INTERNAL MEDICINE

## 2018-11-21 PROCEDURE — A9502 TC99M TETROFOSMIN: HCPCS | Mod: S$GLB,,, | Performed by: INTERNAL MEDICINE

## 2018-11-21 PROCEDURE — 93015 CV STRESS TEST SUPVJ I&R: CPT | Mod: S$GLB,,, | Performed by: INTERNAL MEDICINE

## 2018-11-21 PROCEDURE — 99999 PR PBB SHADOW E&M-EST. PATIENT-LVL I: CPT | Mod: PBBFAC,,,

## 2018-11-23 ENCOUNTER — TELEPHONE (OUTPATIENT)
Dept: CARDIOLOGY | Facility: CLINIC | Age: 58
End: 2018-11-23

## 2018-11-23 NOTE — TELEPHONE ENCOUNTER
----- Message from Luís Garduno MD sent at 11/21/2018  6:06 PM CST -----  Please inform the patent that his stress test is negative confirming that his chest discomfort was not cardiac related.

## 2018-11-26 ENCOUNTER — OFFICE VISIT (OUTPATIENT)
Dept: INTERNAL MEDICINE | Facility: CLINIC | Age: 58
End: 2018-11-26
Payer: COMMERCIAL

## 2018-11-26 VITALS
TEMPERATURE: 98 F | SYSTOLIC BLOOD PRESSURE: 120 MMHG | RESPIRATION RATE: 16 BRPM | DIASTOLIC BLOOD PRESSURE: 72 MMHG | BODY MASS INDEX: 32.42 KG/M2 | HEIGHT: 67 IN | HEART RATE: 89 BPM | WEIGHT: 206.56 LBS

## 2018-11-26 DIAGNOSIS — I10 ESSENTIAL HYPERTENSION: Primary | ICD-10-CM

## 2018-11-26 PROCEDURE — 3074F SYST BP LT 130 MM HG: CPT | Mod: CPTII,S$GLB,, | Performed by: INTERNAL MEDICINE

## 2018-11-26 PROCEDURE — 3008F BODY MASS INDEX DOCD: CPT | Mod: CPTII,S$GLB,, | Performed by: INTERNAL MEDICINE

## 2018-11-26 PROCEDURE — 3078F DIAST BP <80 MM HG: CPT | Mod: CPTII,S$GLB,, | Performed by: INTERNAL MEDICINE

## 2018-11-26 PROCEDURE — 99999 PR PBB SHADOW E&M-EST. PATIENT-LVL III: CPT | Mod: PBBFAC,,, | Performed by: INTERNAL MEDICINE

## 2018-11-26 PROCEDURE — 99213 OFFICE O/P EST LOW 20 MIN: CPT | Mod: S$GLB,,, | Performed by: INTERNAL MEDICINE

## 2018-11-26 RX ORDER — AMLODIPINE BESYLATE 5 MG/1
5 TABLET ORAL DAILY
Qty: 90 TABLET | Refills: 3 | Status: SHIPPED | OUTPATIENT
Start: 2018-11-26 | End: 2020-02-09

## 2018-11-26 NOTE — PROGRESS NOTES
"Subjective:       Patient ID: Maxx Lo is a 58 y.o. male.    Chief Complaint: Follow-up    HPI    58 y.o. male presents for follow up of chronic medical problems including HTN    HTN - Patient is currently on amlodipine 5mg daily. He does check his BP at home, and it runs 100s-140s/70s-90s. Last clinic appt his automatic BP machine was reading about 20 higher systolic and about 10 higher diastolic. Side effects of medications note: none. He denies headaches, blurred vision, dizziness, chest pain, shortness of breath, nausea.      Review of Systems   Constitutional: Negative for activity change and unexpected weight change.   Eyes: Negative for visual disturbance.   Respiratory: Negative for chest tightness and shortness of breath.    Cardiovascular: Negative for chest pain and leg swelling.   Gastrointestinal: Negative for abdominal pain and nausea.   Genitourinary: Negative for difficulty urinating.   Musculoskeletal: Negative for myalgias.   Skin: Negative for color change.   Allergic/Immunologic: Negative for immunocompromised state.   Neurological: Negative for weakness, numbness and headaches.   Hematological: Does not bruise/bleed easily.       Objective:        Vitals:    11/26/18 1436 11/26/18 1456   BP: (!) 129/94 120/72   Pulse: 89    Resp: 16    Temp: 98.2 °F (36.8 °C)    TempSrc: Oral    Weight: 93.7 kg (206 lb 9.1 oz)    Height: 5' 7" (1.702 m)        Body mass index is 32.35 kg/m².    Physical Exam   Constitutional: He is oriented to person, place, and time. He appears well-developed and well-nourished. No distress.   HENT:   Head: Normocephalic and atraumatic.   Nose: Nose normal.   Eyes: Conjunctivae and EOM are normal. Right eye exhibits no discharge. Left eye exhibits no discharge.   Neck: Normal range of motion. Neck supple.   Cardiovascular: Normal rate, normal heart sounds and intact distal pulses.   Pulmonary/Chest: Effort normal and breath sounds normal.   Musculoskeletal: Normal " range of motion. He exhibits no edema.   Neurological: He is alert and oriented to person, place, and time.   Skin: Skin is warm and dry. He is not diaphoretic. No erythema.   Psychiatric: He has a normal mood and affect. His behavior is normal. Thought content normal.       Assessment:     1. Essential hypertension           Plan:         1. Essential hypertension  - amLODIPine (NORVASC) 5 MG tablet; Take 1 tablet (5 mg total) by mouth once daily.  Dispense: 90 tablet; Refill: 3           Patient note was created using MModal Dictation.  Any errors in syntax or even information may not have been identified and edited on initial review prior to signing this note.

## 2019-01-14 ENCOUNTER — LAB VISIT (OUTPATIENT)
Dept: LAB | Facility: HOSPITAL | Age: 59
End: 2019-01-14
Attending: INTERNAL MEDICINE
Payer: COMMERCIAL

## 2019-01-14 ENCOUNTER — TELEPHONE (OUTPATIENT)
Dept: CARDIOLOGY | Facility: CLINIC | Age: 59
End: 2019-01-14

## 2019-01-14 DIAGNOSIS — E78.5 HYPERLIPIDEMIA, UNSPECIFIED HYPERLIPIDEMIA TYPE: ICD-10-CM

## 2019-01-14 LAB
CHOLEST SERPL-MCNC: 157 MG/DL
CHOLEST/HDLC SERPL: 2.9 {RATIO}
HDLC SERPL-MCNC: 55 MG/DL
HDLC SERPL: 35 %
LDLC SERPL CALC-MCNC: 85.4 MG/DL
NONHDLC SERPL-MCNC: 102 MG/DL
TRIGL SERPL-MCNC: 83 MG/DL

## 2019-01-14 PROCEDURE — 80061 LIPID PANEL: CPT

## 2019-01-14 PROCEDURE — 36415 COLL VENOUS BLD VENIPUNCTURE: CPT

## 2019-01-14 NOTE — TELEPHONE ENCOUNTER
----- Message from Luís Garduno MD sent at 1/14/2019 10:06 AM CST -----  Please inform the patient his cholesterol readings are much better and to Continue current regimen

## 2019-10-20 ENCOUNTER — OFFICE VISIT (OUTPATIENT)
Dept: URGENT CARE | Facility: CLINIC | Age: 59
End: 2019-10-20
Payer: COMMERCIAL

## 2019-10-20 VITALS
DIASTOLIC BLOOD PRESSURE: 90 MMHG | SYSTOLIC BLOOD PRESSURE: 149 MMHG | BODY MASS INDEX: 32.18 KG/M2 | TEMPERATURE: 97 F | OXYGEN SATURATION: 95 % | HEIGHT: 67 IN | RESPIRATION RATE: 19 BRPM | HEART RATE: 67 BPM | WEIGHT: 205 LBS

## 2019-10-20 DIAGNOSIS — J68.3 REACTIVE AIRWAYS DYSFUNCTION SYNDROME WITH ACUTE EXACERBATION: ICD-10-CM

## 2019-10-20 DIAGNOSIS — J98.01 ACUTE BRONCHOSPASM: Primary | ICD-10-CM

## 2019-10-20 PROCEDURE — 96372 THER/PROPH/DIAG INJ SC/IM: CPT | Mod: S$GLB,,, | Performed by: INTERNAL MEDICINE

## 2019-10-20 PROCEDURE — 3077F SYST BP >= 140 MM HG: CPT | Mod: CPTII,S$GLB,, | Performed by: INTERNAL MEDICINE

## 2019-10-20 PROCEDURE — 99214 PR OFFICE/OUTPT VISIT, EST, LEVL IV, 30-39 MIN: ICD-10-PCS | Mod: 25,S$GLB,, | Performed by: INTERNAL MEDICINE

## 2019-10-20 PROCEDURE — 96372 PR INJECTION,THERAP/PROPH/DIAG2ST, IM OR SUBCUT: ICD-10-PCS | Mod: S$GLB,,, | Performed by: INTERNAL MEDICINE

## 2019-10-20 PROCEDURE — 3080F PR MOST RECENT DIASTOLIC BLOOD PRESSURE >= 90 MM HG: ICD-10-PCS | Mod: CPTII,S$GLB,, | Performed by: INTERNAL MEDICINE

## 2019-10-20 PROCEDURE — 3080F DIAST BP >= 90 MM HG: CPT | Mod: CPTII,S$GLB,, | Performed by: INTERNAL MEDICINE

## 2019-10-20 PROCEDURE — 3008F PR BODY MASS INDEX (BMI) DOCUMENTED: ICD-10-PCS | Mod: CPTII,S$GLB,, | Performed by: INTERNAL MEDICINE

## 2019-10-20 PROCEDURE — 99214 OFFICE O/P EST MOD 30 MIN: CPT | Mod: 25,S$GLB,, | Performed by: INTERNAL MEDICINE

## 2019-10-20 PROCEDURE — 3077F PR MOST RECENT SYSTOLIC BLOOD PRESSURE >= 140 MM HG: ICD-10-PCS | Mod: CPTII,S$GLB,, | Performed by: INTERNAL MEDICINE

## 2019-10-20 PROCEDURE — 3008F BODY MASS INDEX DOCD: CPT | Mod: CPTII,S$GLB,, | Performed by: INTERNAL MEDICINE

## 2019-10-20 RX ORDER — METHYLPREDNISOLONE 4 MG/1
TABLET ORAL
Qty: 1 PACKAGE | Refills: 0 | Status: SHIPPED | OUTPATIENT
Start: 2019-10-21 | End: 2020-09-23

## 2019-10-20 RX ORDER — LORATADINE 10 MG/1
10 TABLET ORAL DAILY
COMMUNITY
End: 2020-09-23

## 2019-10-20 RX ORDER — BETAMETHASONE SODIUM PHOSPHATE AND BETAMETHASONE ACETATE 3; 3 MG/ML; MG/ML
9 INJECTION, SUSPENSION INTRA-ARTICULAR; INTRALESIONAL; INTRAMUSCULAR; SOFT TISSUE ONCE
Status: COMPLETED | OUTPATIENT
Start: 2019-10-20 | End: 2019-10-20

## 2019-10-20 RX ADMIN — BETAMETHASONE SODIUM PHOSPHATE AND BETAMETHASONE ACETATE 9 MG: 3; 3 INJECTION, SUSPENSION INTRA-ARTICULAR; INTRALESIONAL; INTRAMUSCULAR; SOFT TISSUE at 09:10

## 2019-10-20 NOTE — PROGRESS NOTES
"Subjective:       Patient ID: Maxx Lo is a 59 y.o. male.    Vitals:  height is 5' 7" (1.702 m) and weight is 93 kg (205 lb). His oral temperature is 97.1 °F (36.2 °C). His blood pressure is 149/90 (abnormal) and his pulse is 67. His respiration is 19 and oxygen saturation is 95%.     Chief Complaint: Cough    Cough   This is a new problem. The current episode started in the past 7 days. The problem has been gradually worsening. The problem occurs constantly. The cough is productive of sputum. Associated symptoms include wheezing. Pertinent negatives include no chills, ear pain, eye redness, fever, hemoptysis, myalgias, rash, sore throat or shortness of breath. The symptoms are aggravated by lying down. He has tried nothing for the symptoms.       Constitution: Negative for chills, sweating, fatigue and fever.   HENT: Positive for congestion. Negative for ear pain, sinus pain, sinus pressure, sore throat and voice change.    Neck: Negative for painful lymph nodes.   Eyes: Negative for eye redness.   Respiratory: Positive for cough, sputum production and wheezing. Negative for chest tightness, bloody sputum, COPD, shortness of breath, stridor and asthma.    Gastrointestinal: Negative for nausea and vomiting.   Musculoskeletal: Negative for muscle ache.   Skin: Negative for rash.   Allergic/Immunologic: Negative for seasonal allergies and asthma.   Hematologic/Lymphatic: Negative for swollen lymph nodes.       Objective:      Physical Exam   Constitutional: He appears well-developed and well-nourished.   HENT:   Head: Normocephalic and atraumatic.   Eyes: Pupils are equal, round, and reactive to light. Conjunctivae and EOM are normal.   Neck: Normal range of motion. Neck supple.   Cardiovascular: Normal rate and regular rhythm.   Pulmonary/Chest: He has wheezes.   Mild increased effort of breathing   Nursing note and vitals reviewed.        Assessment:       1. Acute bronchospasm    2. Reactive airways " dysfunction syndrome with acute exacerbation        Plan:         Acute bronchospasm  -     betamethasone acetate-betamethasone sodium phosphate injection 9 mg  -     methylPREDNISolone (MEDROL DOSEPACK) 4 mg tablet; use as directed  Dispense: 1 Package; Refill: 0    Reactive airways dysfunction syndrome with acute exacerbation  -     betamethasone acetate-betamethasone sodium phosphate injection 9 mg  -     methylPREDNISolone (MEDROL DOSEPACK) 4 mg tablet; use as directed  Dispense: 1 Package; Refill: 0

## 2019-10-20 NOTE — PATIENT INSTRUCTIONS
Take mucinex or similar product next few weeks; AVOID ANTIHISTAMINES DUE TO WHEEZING; STAY WELL HYDRATED

## 2019-11-08 DIAGNOSIS — E78.5 HYPERLIPIDEMIA, UNSPECIFIED HYPERLIPIDEMIA TYPE: ICD-10-CM

## 2019-11-08 RX ORDER — ATORVASTATIN CALCIUM 40 MG/1
TABLET, FILM COATED ORAL
Qty: 90 TABLET | Refills: 1 | Status: SHIPPED | OUTPATIENT
Start: 2019-11-08 | End: 2020-05-10

## 2020-02-08 DIAGNOSIS — I10 ESSENTIAL HYPERTENSION: ICD-10-CM

## 2020-02-09 RX ORDER — AMLODIPINE BESYLATE 5 MG/1
TABLET ORAL
Qty: 90 TABLET | Refills: 0 | Status: SHIPPED | OUTPATIENT
Start: 2020-02-09 | End: 2020-05-03

## 2020-05-03 DIAGNOSIS — I10 ESSENTIAL HYPERTENSION: ICD-10-CM

## 2020-05-03 RX ORDER — AMLODIPINE BESYLATE 5 MG/1
TABLET ORAL
Qty: 90 TABLET | Refills: 0 | Status: SHIPPED | OUTPATIENT
Start: 2020-05-03 | End: 2020-09-21 | Stop reason: SDUPTHER

## 2020-05-10 DIAGNOSIS — E78.5 HYPERLIPIDEMIA, UNSPECIFIED HYPERLIPIDEMIA TYPE: ICD-10-CM

## 2020-05-10 RX ORDER — ATORVASTATIN CALCIUM 40 MG/1
TABLET, FILM COATED ORAL
Qty: 90 TABLET | Refills: 1 | Status: SHIPPED | OUTPATIENT
Start: 2020-05-10 | End: 2020-09-23 | Stop reason: SDUPTHER

## 2020-09-21 DIAGNOSIS — I10 ESSENTIAL HYPERTENSION: ICD-10-CM

## 2020-09-21 NOTE — TELEPHONE ENCOUNTER
----- Message from Maria M Lawrence sent at 9/21/2020  4:47 PM CDT -----  Regarding: refill  Contact: patient [phone]    Patient is out of medication    Requesting an RX refill or new RX.  Is this a refill or new RX:  refill  RX name and strength: amLODIPine (NORVASC) 5 MG tablet  Directions (copy/paste from chart):  TAKE 1 TABLET BY MOUTH EVERY DAY  Is this a 30 day or 90 day RX:  90  Local pharmacy or mail order pharmacy:  local  Pharmacy name and phone #[facility]/pharmacy #[id number] - [address] [phone] (Phone) [phone] (Fax)

## 2020-09-22 ENCOUNTER — TELEPHONE (OUTPATIENT)
Dept: INTERNAL MEDICINE | Facility: CLINIC | Age: 60
End: 2020-09-22

## 2020-09-22 DIAGNOSIS — Z00.00 ANNUAL PHYSICAL EXAM: Primary | ICD-10-CM

## 2020-09-22 RX ORDER — AMLODIPINE BESYLATE 5 MG/1
5 TABLET ORAL DAILY
Qty: 30 TABLET | Refills: 0 | Status: SHIPPED | OUTPATIENT
Start: 2020-09-22 | End: 2020-09-23 | Stop reason: SDUPTHER

## 2020-09-22 NOTE — TELEPHONE ENCOUNTER
----- Message from Eris Haynes sent at 9/22/2020 10:51 AM CDT -----  Contact: Patient  Doctor appointment and lab have been scheduled.  Please link lab orders to the lab appointment.  Date of doctor appointment:    Physical or EP:  9/23/20  Date of lab appointment:  will come in fasting for appt   Comments:     Thank you

## 2020-09-23 ENCOUNTER — OFFICE VISIT (OUTPATIENT)
Dept: INTERNAL MEDICINE | Facility: CLINIC | Age: 60
End: 2020-09-23
Payer: COMMERCIAL

## 2020-09-23 ENCOUNTER — TELEPHONE (OUTPATIENT)
Dept: INTERNAL MEDICINE | Facility: CLINIC | Age: 60
End: 2020-09-23

## 2020-09-23 ENCOUNTER — LAB VISIT (OUTPATIENT)
Dept: LAB | Facility: HOSPITAL | Age: 60
End: 2020-09-23
Attending: INTERNAL MEDICINE
Payer: COMMERCIAL

## 2020-09-23 VITALS
DIASTOLIC BLOOD PRESSURE: 80 MMHG | HEIGHT: 67 IN | SYSTOLIC BLOOD PRESSURE: 124 MMHG | TEMPERATURE: 98 F | HEART RATE: 60 BPM | RESPIRATION RATE: 16 BRPM | BODY MASS INDEX: 32.04 KG/M2 | WEIGHT: 204.13 LBS

## 2020-09-23 DIAGNOSIS — Z12.11 COLON CANCER SCREENING: ICD-10-CM

## 2020-09-23 DIAGNOSIS — E78.5 HYPERLIPIDEMIA, UNSPECIFIED HYPERLIPIDEMIA TYPE: ICD-10-CM

## 2020-09-23 DIAGNOSIS — Z23 NEED FOR INFLUENZA VACCINATION: ICD-10-CM

## 2020-09-23 DIAGNOSIS — Z00.00 ANNUAL PHYSICAL EXAM: Primary | ICD-10-CM

## 2020-09-23 DIAGNOSIS — Z00.00 ANNUAL PHYSICAL EXAM: ICD-10-CM

## 2020-09-23 DIAGNOSIS — I10 ESSENTIAL HYPERTENSION: ICD-10-CM

## 2020-09-23 DIAGNOSIS — Z23 NEED FOR SHINGLES VACCINE: ICD-10-CM

## 2020-09-23 DIAGNOSIS — J45.909 ASTHMA, UNSPECIFIED ASTHMA SEVERITY, UNSPECIFIED WHETHER COMPLICATED, UNSPECIFIED WHETHER PERSISTENT: ICD-10-CM

## 2020-09-23 LAB
ALBUMIN SERPL BCP-MCNC: 3.7 G/DL (ref 3.5–5.2)
ALP SERPL-CCNC: 119 U/L (ref 55–135)
ALT SERPL W/O P-5'-P-CCNC: 24 U/L (ref 10–44)
ANION GAP SERPL CALC-SCNC: 8 MMOL/L (ref 8–16)
AST SERPL-CCNC: 28 U/L (ref 10–40)
BASOPHILS # BLD AUTO: 0.08 K/UL (ref 0–0.2)
BASOPHILS NFR BLD: 0.9 % (ref 0–1.9)
BILIRUB SERPL-MCNC: 0.5 MG/DL (ref 0.1–1)
BUN SERPL-MCNC: 15 MG/DL (ref 6–20)
CALCIUM SERPL-MCNC: 9.8 MG/DL (ref 8.7–10.5)
CHLORIDE SERPL-SCNC: 107 MMOL/L (ref 95–110)
CHOLEST SERPL-MCNC: 165 MG/DL (ref 120–199)
CHOLEST/HDLC SERPL: 3.1 {RATIO} (ref 2–5)
CO2 SERPL-SCNC: 27 MMOL/L (ref 23–29)
COMPLEXED PSA SERPL-MCNC: 0.56 NG/ML (ref 0–4)
CREAT SERPL-MCNC: 1.1 MG/DL (ref 0.5–1.4)
DIFFERENTIAL METHOD: ABNORMAL
EOSINOPHIL # BLD AUTO: 0.2 K/UL (ref 0–0.5)
EOSINOPHIL NFR BLD: 2 % (ref 0–8)
ERYTHROCYTE [DISTWIDTH] IN BLOOD BY AUTOMATED COUNT: 13.5 % (ref 11.5–14.5)
EST. GFR  (AFRICAN AMERICAN): >60 ML/MIN/1.73 M^2
EST. GFR  (NON AFRICAN AMERICAN): >60 ML/MIN/1.73 M^2
GLUCOSE SERPL-MCNC: 80 MG/DL (ref 70–110)
HCT VFR BLD AUTO: 48 % (ref 40–54)
HDLC SERPL-MCNC: 54 MG/DL (ref 40–75)
HDLC SERPL: 32.7 % (ref 20–50)
HGB BLD-MCNC: 15.3 G/DL (ref 14–18)
IMM GRANULOCYTES # BLD AUTO: 0.02 K/UL (ref 0–0.04)
IMM GRANULOCYTES NFR BLD AUTO: 0.2 % (ref 0–0.5)
LDLC SERPL CALC-MCNC: 99.4 MG/DL (ref 63–159)
LYMPHOCYTES # BLD AUTO: 1.5 K/UL (ref 1–4.8)
LYMPHOCYTES NFR BLD: 16.2 % (ref 18–48)
MCH RBC QN AUTO: 32.1 PG (ref 27–31)
MCHC RBC AUTO-ENTMCNC: 31.9 G/DL (ref 32–36)
MCV RBC AUTO: 101 FL (ref 82–98)
MONOCYTES # BLD AUTO: 0.8 K/UL (ref 0.3–1)
MONOCYTES NFR BLD: 8.3 % (ref 4–15)
NEUTROPHILS # BLD AUTO: 6.6 K/UL (ref 1.8–7.7)
NEUTROPHILS NFR BLD: 72.4 % (ref 38–73)
NONHDLC SERPL-MCNC: 111 MG/DL
NRBC BLD-RTO: 0 /100 WBC
PLATELET # BLD AUTO: 234 K/UL (ref 150–350)
PMV BLD AUTO: 11.7 FL (ref 9.2–12.9)
POTASSIUM SERPL-SCNC: 5.5 MMOL/L (ref 3.5–5.1)
PROT SERPL-MCNC: 6.9 G/DL (ref 6–8.4)
RBC # BLD AUTO: 4.76 M/UL (ref 4.6–6.2)
SARS-COV-2 IGG SERPLBLD QL IA.RAPID: NEGATIVE
SODIUM SERPL-SCNC: 142 MMOL/L (ref 136–145)
TRIGL SERPL-MCNC: 58 MG/DL (ref 30–150)
TSH SERPL DL<=0.005 MIU/L-ACNC: 3.69 UIU/ML (ref 0.4–4)
WBC # BLD AUTO: 9.15 K/UL (ref 3.9–12.7)

## 2020-09-23 PROCEDURE — 83036 HEMOGLOBIN GLYCOSYLATED A1C: CPT

## 2020-09-23 PROCEDURE — 3008F PR BODY MASS INDEX (BMI) DOCUMENTED: ICD-10-PCS | Mod: CPTII,S$GLB,, | Performed by: INTERNAL MEDICINE

## 2020-09-23 PROCEDURE — 3079F PR MOST RECENT DIASTOLIC BLOOD PRESSURE 80-89 MM HG: ICD-10-PCS | Mod: CPTII,S$GLB,, | Performed by: INTERNAL MEDICINE

## 2020-09-23 PROCEDURE — 90686 IIV4 VACC NO PRSV 0.5 ML IM: CPT | Mod: S$GLB,,, | Performed by: INTERNAL MEDICINE

## 2020-09-23 PROCEDURE — 80061 LIPID PANEL: CPT

## 2020-09-23 PROCEDURE — 3074F PR MOST RECENT SYSTOLIC BLOOD PRESSURE < 130 MM HG: ICD-10-PCS | Mod: CPTII,S$GLB,, | Performed by: INTERNAL MEDICINE

## 2020-09-23 PROCEDURE — 99999 PR PBB SHADOW E&M-EST. PATIENT-LVL IV: CPT | Mod: PBBFAC,,, | Performed by: INTERNAL MEDICINE

## 2020-09-23 PROCEDURE — 3079F DIAST BP 80-89 MM HG: CPT | Mod: CPTII,S$GLB,, | Performed by: INTERNAL MEDICINE

## 2020-09-23 PROCEDURE — 36415 COLL VENOUS BLD VENIPUNCTURE: CPT | Mod: PO

## 2020-09-23 PROCEDURE — 3074F SYST BP LT 130 MM HG: CPT | Mod: CPTII,S$GLB,, | Performed by: INTERNAL MEDICINE

## 2020-09-23 PROCEDURE — 85025 COMPLETE CBC W/AUTO DIFF WBC: CPT

## 2020-09-23 PROCEDURE — 90471 IMMUNIZATION ADMIN: CPT | Mod: S$GLB,,, | Performed by: INTERNAL MEDICINE

## 2020-09-23 PROCEDURE — 99999 PR PBB SHADOW E&M-EST. PATIENT-LVL IV: ICD-10-PCS | Mod: PBBFAC,,, | Performed by: INTERNAL MEDICINE

## 2020-09-23 PROCEDURE — 80053 COMPREHEN METABOLIC PANEL: CPT

## 2020-09-23 PROCEDURE — 3008F BODY MASS INDEX DOCD: CPT | Mod: CPTII,S$GLB,, | Performed by: INTERNAL MEDICINE

## 2020-09-23 PROCEDURE — 99396 PR PREVENTIVE VISIT,EST,40-64: ICD-10-PCS | Mod: 25,S$GLB,, | Performed by: INTERNAL MEDICINE

## 2020-09-23 PROCEDURE — 86769 SARS-COV-2 COVID-19 ANTIBODY: CPT

## 2020-09-23 PROCEDURE — 90686 FLU VACCINE (QUAD) GREATER THAN OR EQUAL TO 3YO PRESERVATIVE FREE IM: ICD-10-PCS | Mod: S$GLB,,, | Performed by: INTERNAL MEDICINE

## 2020-09-23 PROCEDURE — 84153 ASSAY OF PSA TOTAL: CPT

## 2020-09-23 PROCEDURE — 84443 ASSAY THYROID STIM HORMONE: CPT

## 2020-09-23 PROCEDURE — 90471 FLU VACCINE (QUAD) GREATER THAN OR EQUAL TO 3YO PRESERVATIVE FREE IM: ICD-10-PCS | Mod: S$GLB,,, | Performed by: INTERNAL MEDICINE

## 2020-09-23 PROCEDURE — 99396 PREV VISIT EST AGE 40-64: CPT | Mod: 25,S$GLB,, | Performed by: INTERNAL MEDICINE

## 2020-09-23 RX ORDER — ALBUTEROL SULFATE 90 UG/1
1-2 AEROSOL, METERED RESPIRATORY (INHALATION) EVERY 6 HOURS PRN
Qty: 18 G | Refills: 1 | Status: SHIPPED | OUTPATIENT
Start: 2020-09-23 | End: 2020-11-09

## 2020-09-23 RX ORDER — ZOSTER VACCINE RECOMBINANT, ADJUVANTED 50 MCG/0.5
0.5 KIT INTRAMUSCULAR ONCE
Qty: 1 EACH | Refills: 1 | Status: SHIPPED | OUTPATIENT
Start: 2020-09-23 | End: 2020-09-23

## 2020-09-23 RX ORDER — AMLODIPINE BESYLATE 5 MG/1
5 TABLET ORAL DAILY
Qty: 90 TABLET | Refills: 1 | Status: SHIPPED | OUTPATIENT
Start: 2020-09-23 | End: 2021-04-22 | Stop reason: SDUPTHER

## 2020-09-23 RX ORDER — ATORVASTATIN CALCIUM 40 MG/1
40 TABLET, FILM COATED ORAL DAILY
Qty: 90 TABLET | Refills: 3 | Status: SHIPPED | OUTPATIENT
Start: 2020-09-23 | End: 2021-08-02 | Stop reason: SDUPTHER

## 2020-09-23 NOTE — PROGRESS NOTES
Subjective:       Patient ID: Maxx Lo is a 59 y.o. male.    Chief Complaint: Annual Exam    HPI   59 y.o. male here for annual exam.     Health Maintenance/ Screening:   Labs: due  Colorectal Cancer: iFOBT due     Vaccines:   Influenza: due   Tetanus: 2018    Shingrix: due       Past Medical History:   Diagnosis Date    ALLERGIC RHINITIS     Bronchial asthma     Hyperlipemia     Hyperlipidemia     Hypertension     not on medication    Obesity      Past Surgical History:   Procedure Laterality Date    Repair ruptured left biceps  2004     Family History   Problem Relation Age of Onset    Coronary artery disease Father     Heart disease Father     Diabetes Mother     Heart disease Mother     Hypertension Mother     Diabetes Sister     Hypertension Brother      Social History     Socioeconomic History    Marital status:      Spouse name: Not on file    Number of children: Not on file    Years of education: Not on file    Highest education level: Not on file   Occupational History    Occupation: mel   Social Needs    Financial resource strain: Not on file    Food insecurity     Worry: Not on file     Inability: Not on file    Transportation needs     Medical: Not on file     Non-medical: Not on file   Tobacco Use    Smoking status: Never Smoker    Smokeless tobacco: Never Used   Substance and Sexual Activity    Alcohol use: Yes     Alcohol/week: 2.5 standard drinks     Types: 3 Standard drinks or equivalent per week     Comment: 2x/week    Drug use: No    Sexual activity: Not on file   Lifestyle    Physical activity     Days per week: Not on file     Minutes per session: Not on file    Stress: Not on file   Relationships    Social connections     Talks on phone: Not on file     Gets together: Not on file     Attends Yarsanism service: Not on file     Active member of club or organization: Not on file     Attends meetings of clubs or organizations: Not on file      Relationship status: Not on file   Other Topics Concern    Not on file   Social History Narrative    Not on file     Review of patient's allergies indicates:  No Known Allergies    Current Outpatient Medications:     amLODIPine (NORVASC) 5 MG tablet, Take 1 tablet (5 mg total) by mouth once daily., Disp: 90 tablet, Rfl: 1    aspirin (ECOTRIN) 81 MG EC tablet, Take 81 mg by mouth once daily., Disp: , Rfl:     atorvastatin (LIPITOR) 40 MG tablet, Take 1 tablet (40 mg total) by mouth once daily., Disp: 90 tablet, Rfl: 3    krill/om-3/dha/epa/phospho/ast (MEGARED OMEGA-3 KRILL OIL ORAL), Take by mouth., Disp: , Rfl:     multivit with min-FA-lycopene (ONE-A-DAY MEN'S) 0.4-600 mg-mcg Tab, Take by mouth. 1 Tablet Oral Every day, Disp: , Rfl:     albuterol (PROAIR HFA) 90 mcg/actuation inhaler, Inhale 1-2 puffs into the lungs every 6 (six) hours as needed for Wheezing or Shortness of Breath. generic ok, Disp: 18 g, Rfl: 1    fluticasone (FLOVENT HFA) 220 mcg/actuation inhaler, Inhale 1 puff into the lungs 2 (two) times daily. Controller (Patient not taking: Reported on 9/23/2020), Disp: 12 g, Rfl: 1    triamcinolone acetonide 0.1% (KENALOG) 0.1 % cream, Apply topically 2 (two) times daily. for 7 days, Disp: 45 g, Rfl: 0    varicella-zoster gE-AS01B, PF, (SHINGRIX, PF,) 50 mcg/0.5 mL injection, Inject 0.5 mLs into the muscle once. for 1 dose, Disp: 1 each, Rfl: 1        Review of Systems   Constitutional: Negative for diaphoresis and fever.   HENT: Positive for dental problem (now s/p root canal and doing well). Negative for trouble swallowing.    Eyes: Negative for discharge and redness.   Respiratory: Positive for wheezing (with exercise). Negative for cough, chest tightness and shortness of breath.    Cardiovascular: Negative for chest pain and leg swelling.   Gastrointestinal: Positive for diarrhea (d/t abx for root canal, now resolved). Negative for abdominal pain, blood in stool and constipation.  "  Genitourinary: Negative for difficulty urinating, dysuria and frequency.   Musculoskeletal: Positive for arthralgias (generalized). Negative for gait problem and joint swelling.   Skin: Negative for pallor, rash and wound.   Allergic/Immunologic: Positive for environmental allergies.   Neurological: Negative for weakness and numbness.       Objective:        Vitals:    09/23/20 0958   BP: 124/80   Pulse: 60   Resp: 16   Temp: 97.9 °F (36.6 °C)   TempSrc: Oral   Weight: 92.6 kg (204 lb 2.3 oz)   Height: 5' 7" (1.702 m)       Body mass index is 31.97 kg/m².    Physical Exam  Constitutional:       General: He is not in acute distress.     Appearance: He is well-developed. He is not diaphoretic.   HENT:      Head: Normocephalic and atraumatic.      Right Ear: Tympanic membrane normal.      Left Ear: Tympanic membrane normal.   Eyes:      Conjunctiva/sclera: Conjunctivae normal.   Neck:      Musculoskeletal: Neck supple.      Thyroid: No thyromegaly.      Trachea: No tracheal deviation.   Cardiovascular:      Rate and Rhythm: Normal rate and regular rhythm.      Heart sounds: Normal heart sounds.   Pulmonary:      Effort: Pulmonary effort is normal.      Breath sounds: Normal breath sounds.   Abdominal:      General: Bowel sounds are normal. There is no distension.      Palpations: Abdomen is soft.      Tenderness: There is no abdominal tenderness.   Musculoskeletal:      Right lower leg: No edema.      Left lower leg: No edema.   Lymphadenopathy:      Cervical: No cervical adenopathy.   Skin:     General: Skin is warm and dry.      Nails: There is no clubbing.     Neurological:      General: No focal deficit present.      Mental Status: He is alert. Mental status is at baseline.      Sensory: No sensory deficit.   Psychiatric:         Behavior: Behavior normal.         Judgment: Judgment normal.         Assessment:     1. Annual physical exam    2. Hyperlipidemia, unspecified hyperlipidemia type    3. Essential " hypertension    4. Colon cancer screening    5. Need for influenza vaccination    6. Need for shingles vaccine    7. Asthma, unspecified asthma severity, unspecified whether complicated, unspecified whether persistent           Plan:         1. Annual physical exam  - fasting labs today  - COVID-19 (SARS CoV-2) IgG Antibody; Future    2. Hyperlipidemia, unspecified hyperlipidemia type  - atorvastatin (LIPITOR) 40 MG tablet; Take 1 tablet (40 mg total) by mouth once daily.  Dispense: 90 tablet; Refill: 3    3. Essential hypertension  - amLODIPine (NORVASC) 5 MG tablet; Take 1 tablet (5 mg total) by mouth once daily.  Dispense: 90 tablet; Refill: 1    4. Colon cancer screening  - Fecal Immunochemical Test (iFOBT); Future    5. Need for influenza vaccination  - flu vaccine today    6. Need for shingles vaccine  - varicella-zoster gE-AS01B, PF, (SHINGRIX, PF,) 50 mcg/0.5 mL injection; Inject 0.5 mLs into the muscle once. for 1 dose  Dispense: 1 each; Refill: 1    7. Asthma, unspecified asthma severity, unspecified whether complicated, unspecified whether persistent  - albuterol (PROAIR HFA) 90 mcg/actuation inhaler; Inhale 1-2 puffs into the lungs every 6 (six) hours as needed for Wheezing or Shortness of Breath. generic ok  Dispense: 18 g; Refill: 1           Patient note was created using MModal Dictation.  Any errors in syntax or even information may not have been identified and edited on initial review prior to signing this note.

## 2020-09-24 ENCOUNTER — TELEPHONE (OUTPATIENT)
Dept: INTERNAL MEDICINE | Facility: CLINIC | Age: 60
End: 2020-09-24

## 2020-09-24 PROBLEM — R73.03 PREDIABETES: Status: ACTIVE | Noted: 2020-09-24

## 2020-09-24 LAB
ESTIMATED AVG GLUCOSE: 120 MG/DL (ref 68–131)
HBA1C MFR BLD HPLC: 5.8 % (ref 4–5.6)

## 2020-09-24 NOTE — TELEPHONE ENCOUNTER
----- Message from Joyce Armando MD sent at 9/24/2020 11:34 AM CDT -----  Please inform patient of test results:   Blood count normal- no anemia.  Liver enzymes normal. Electrolytes within acceptable range and kidney function normal.   Cholesterol is normal on atorvastatin- continue.   3 month average of sugar shows prediabetes. Important to exercise and eat a healthy low carb diet.   Thyroid is normal.   COVID antibody is negative.   PSA (prostate) is normal.     rtc 6 mo f/u HTN, HLD, prediabetes

## 2020-09-24 NOTE — TELEPHONE ENCOUNTER
----- Message from Eris Haynes sent at 9/24/2020  3:56 PM CDT -----  Regarding: Results  Contact: Patient [phone]  Patient would like to get test results.  Name of test (lab, mammo, etc.):  lab   Date of test:  9/23/20  Ordering provider:   Where was the test performed: [facility]   Comments:  patient would like to have Test results mailed out to patient home      Please call an advise  Thank you

## 2020-09-24 NOTE — TELEPHONE ENCOUNTER
Spoke to pt and let him know Blood count normal- no anemia.   Liver enzymes normal. Electrolytes within acceptable range and kidney function normal.   Cholesterol is normal on atorvastatin- continue.   3 month average of sugar shows prediabetes. Important to exercise and eat a healthy low carb diet.   Thyroid is normal.   COVID antibody is negative.   PSA (prostate) is normal.   Also told him he needs to rtc in 6 months for f/u  He expressed understanding    Set up 6 month recall for pt

## 2021-04-22 DIAGNOSIS — J45.909 ASTHMA, UNSPECIFIED ASTHMA SEVERITY, UNSPECIFIED WHETHER COMPLICATED, UNSPECIFIED WHETHER PERSISTENT: ICD-10-CM

## 2021-04-22 DIAGNOSIS — I10 ESSENTIAL HYPERTENSION: ICD-10-CM

## 2021-04-22 RX ORDER — AMLODIPINE BESYLATE 5 MG/1
5 TABLET ORAL DAILY
Qty: 90 TABLET | Refills: 1 | Status: SHIPPED | OUTPATIENT
Start: 2021-04-22 | End: 2021-08-02 | Stop reason: SDUPTHER

## 2021-05-08 ENCOUNTER — IMMUNIZATION (OUTPATIENT)
Dept: PRIMARY CARE CLINIC | Facility: CLINIC | Age: 61
End: 2021-05-08
Payer: COMMERCIAL

## 2021-05-08 DIAGNOSIS — Z23 NEED FOR VACCINATION: Primary | ICD-10-CM

## 2021-05-08 PROCEDURE — 91300 PR SARS-COV- 2 COVID-19 VACCINE, NO PRSV, 30MCG/0.3ML, IM: CPT | Mod: PBBFAC | Performed by: INTERNAL MEDICINE

## 2021-05-08 PROCEDURE — 0001A PR IMMUNIZ ADMIN, SARS-COV-2 COVID-19 VACC, 30MCG/0.3ML, 1ST DOSE: CPT | Mod: PBBFAC | Performed by: INTERNAL MEDICINE

## 2021-05-08 RX ADMIN — RNA INGREDIENT BNT-162B2 0.3 ML: 0.23 INJECTION, SUSPENSION INTRAMUSCULAR at 11:05

## 2021-05-29 ENCOUNTER — IMMUNIZATION (OUTPATIENT)
Dept: INTERNAL MEDICINE | Facility: CLINIC | Age: 61
End: 2021-05-29
Payer: COMMERCIAL

## 2021-05-29 DIAGNOSIS — Z23 NEED FOR VACCINATION: Primary | ICD-10-CM

## 2021-05-29 PROCEDURE — 91300 COVID-19, MRNA, LNP-S, PF, 30 MCG/0.3 ML DOSE VACCINE: CPT | Mod: PBBFAC

## 2021-05-29 PROCEDURE — 0002A COVID-19, MRNA, LNP-S, PF, 30 MCG/0.3 ML DOSE VACCINE: CPT | Mod: PBBFAC

## 2021-08-02 ENCOUNTER — LAB VISIT (OUTPATIENT)
Dept: LAB | Facility: HOSPITAL | Age: 61
End: 2021-08-02
Attending: INTERNAL MEDICINE
Payer: COMMERCIAL

## 2021-08-02 ENCOUNTER — OFFICE VISIT (OUTPATIENT)
Dept: INTERNAL MEDICINE | Facility: CLINIC | Age: 61
End: 2021-08-02
Payer: COMMERCIAL

## 2021-08-02 VITALS
HEIGHT: 67 IN | BODY MASS INDEX: 31.66 KG/M2 | WEIGHT: 201.75 LBS | OXYGEN SATURATION: 98 % | TEMPERATURE: 98 F | DIASTOLIC BLOOD PRESSURE: 88 MMHG | RESPIRATION RATE: 18 BRPM | HEART RATE: 65 BPM | SYSTOLIC BLOOD PRESSURE: 138 MMHG

## 2021-08-02 DIAGNOSIS — R73.03 PREDIABETES: ICD-10-CM

## 2021-08-02 DIAGNOSIS — I10 ESSENTIAL HYPERTENSION: ICD-10-CM

## 2021-08-02 DIAGNOSIS — E78.5 HYPERLIPIDEMIA, UNSPECIFIED HYPERLIPIDEMIA TYPE: ICD-10-CM

## 2021-08-02 DIAGNOSIS — Z00.00 ANNUAL PHYSICAL EXAM: ICD-10-CM

## 2021-08-02 DIAGNOSIS — Z23 NEED FOR SHINGLES VACCINE: ICD-10-CM

## 2021-08-02 DIAGNOSIS — Z00.00 ANNUAL PHYSICAL EXAM: Primary | ICD-10-CM

## 2021-08-02 LAB
ALBUMIN SERPL BCP-MCNC: 3.9 G/DL (ref 3.5–5.2)
ALP SERPL-CCNC: 115 U/L (ref 55–135)
ALT SERPL W/O P-5'-P-CCNC: 29 U/L (ref 10–44)
ANION GAP SERPL CALC-SCNC: 8 MMOL/L (ref 8–16)
AST SERPL-CCNC: 34 U/L (ref 10–40)
BASOPHILS # BLD AUTO: 0.07 K/UL (ref 0–0.2)
BASOPHILS NFR BLD: 0.7 % (ref 0–1.9)
BILIRUB SERPL-MCNC: 0.5 MG/DL (ref 0.1–1)
BUN SERPL-MCNC: 14 MG/DL (ref 6–20)
CALCIUM SERPL-MCNC: 9.6 MG/DL (ref 8.7–10.5)
CHLORIDE SERPL-SCNC: 110 MMOL/L (ref 95–110)
CHOLEST SERPL-MCNC: 169 MG/DL (ref 120–199)
CHOLEST/HDLC SERPL: 3 {RATIO} (ref 2–5)
CO2 SERPL-SCNC: 26 MMOL/L (ref 23–29)
CREAT SERPL-MCNC: 1.1 MG/DL (ref 0.5–1.4)
DIFFERENTIAL METHOD: ABNORMAL
EOSINOPHIL # BLD AUTO: 0.1 K/UL (ref 0–0.5)
EOSINOPHIL NFR BLD: 1 % (ref 0–8)
ERYTHROCYTE [DISTWIDTH] IN BLOOD BY AUTOMATED COUNT: 13.3 % (ref 11.5–14.5)
EST. GFR  (AFRICAN AMERICAN): >60 ML/MIN/1.73 M^2
EST. GFR  (NON AFRICAN AMERICAN): >60 ML/MIN/1.73 M^2
GLUCOSE SERPL-MCNC: 83 MG/DL (ref 70–110)
HCT VFR BLD AUTO: 44.3 % (ref 40–54)
HDLC SERPL-MCNC: 56 MG/DL (ref 40–75)
HDLC SERPL: 33.1 % (ref 20–50)
HGB BLD-MCNC: 15.4 G/DL (ref 14–18)
IMM GRANULOCYTES # BLD AUTO: 0.06 K/UL (ref 0–0.04)
IMM GRANULOCYTES NFR BLD AUTO: 0.6 % (ref 0–0.5)
LDLC SERPL CALC-MCNC: 97.8 MG/DL (ref 63–159)
LYMPHOCYTES # BLD AUTO: 1.3 K/UL (ref 1–4.8)
LYMPHOCYTES NFR BLD: 12 % (ref 18–48)
MCH RBC QN AUTO: 33.8 PG (ref 27–31)
MCHC RBC AUTO-ENTMCNC: 34.8 G/DL (ref 32–36)
MCV RBC AUTO: 97 FL (ref 82–98)
MONOCYTES # BLD AUTO: 0.8 K/UL (ref 0.3–1)
MONOCYTES NFR BLD: 7.2 % (ref 4–15)
NEUTROPHILS # BLD AUTO: 8.3 K/UL (ref 1.8–7.7)
NEUTROPHILS NFR BLD: 78.5 % (ref 38–73)
NONHDLC SERPL-MCNC: 113 MG/DL
NRBC BLD-RTO: 0 /100 WBC
PLATELET # BLD AUTO: 200 K/UL (ref 150–450)
PMV BLD AUTO: 11.8 FL (ref 9.2–12.9)
POTASSIUM SERPL-SCNC: 4.9 MMOL/L (ref 3.5–5.1)
PROT SERPL-MCNC: 7.3 G/DL (ref 6–8.4)
RBC # BLD AUTO: 4.55 M/UL (ref 4.6–6.2)
SODIUM SERPL-SCNC: 144 MMOL/L (ref 136–145)
T4 FREE SERPL-MCNC: 0.85 NG/DL (ref 0.71–1.51)
TRIGL SERPL-MCNC: 76 MG/DL (ref 30–150)
TSH SERPL DL<=0.005 MIU/L-ACNC: 5.01 UIU/ML (ref 0.4–4)
WBC # BLD AUTO: 10.59 K/UL (ref 3.9–12.7)

## 2021-08-02 PROCEDURE — 83036 HEMOGLOBIN GLYCOSYLATED A1C: CPT | Performed by: INTERNAL MEDICINE

## 2021-08-02 PROCEDURE — 1159F PR MEDICATION LIST DOCUMENTED IN MEDICAL RECORD: ICD-10-PCS | Mod: CPTII,S$GLB,, | Performed by: INTERNAL MEDICINE

## 2021-08-02 PROCEDURE — 99396 PR PREVENTIVE VISIT,EST,40-64: ICD-10-PCS | Mod: S$GLB,,, | Performed by: INTERNAL MEDICINE

## 2021-08-02 PROCEDURE — 1159F MED LIST DOCD IN RCRD: CPT | Mod: CPTII,S$GLB,, | Performed by: INTERNAL MEDICINE

## 2021-08-02 PROCEDURE — 36415 COLL VENOUS BLD VENIPUNCTURE: CPT | Mod: PO | Performed by: INTERNAL MEDICINE

## 2021-08-02 PROCEDURE — 3079F DIAST BP 80-89 MM HG: CPT | Mod: CPTII,S$GLB,, | Performed by: INTERNAL MEDICINE

## 2021-08-02 PROCEDURE — 3079F PR MOST RECENT DIASTOLIC BLOOD PRESSURE 80-89 MM HG: ICD-10-PCS | Mod: CPTII,S$GLB,, | Performed by: INTERNAL MEDICINE

## 2021-08-02 PROCEDURE — 84153 ASSAY OF PSA TOTAL: CPT | Performed by: INTERNAL MEDICINE

## 2021-08-02 PROCEDURE — 99999 PR PBB SHADOW E&M-EST. PATIENT-LVL IV: ICD-10-PCS | Mod: PBBFAC,,, | Performed by: INTERNAL MEDICINE

## 2021-08-02 PROCEDURE — 85025 COMPLETE CBC W/AUTO DIFF WBC: CPT | Performed by: INTERNAL MEDICINE

## 2021-08-02 PROCEDURE — 3008F PR BODY MASS INDEX (BMI) DOCUMENTED: ICD-10-PCS | Mod: CPTII,S$GLB,, | Performed by: INTERNAL MEDICINE

## 2021-08-02 PROCEDURE — 1126F AMNT PAIN NOTED NONE PRSNT: CPT | Mod: CPTII,S$GLB,, | Performed by: INTERNAL MEDICINE

## 2021-08-02 PROCEDURE — 1160F RVW MEDS BY RX/DR IN RCRD: CPT | Mod: CPTII,S$GLB,, | Performed by: INTERNAL MEDICINE

## 2021-08-02 PROCEDURE — 80061 LIPID PANEL: CPT | Performed by: INTERNAL MEDICINE

## 2021-08-02 PROCEDURE — 99999 PR PBB SHADOW E&M-EST. PATIENT-LVL IV: CPT | Mod: PBBFAC,,, | Performed by: INTERNAL MEDICINE

## 2021-08-02 PROCEDURE — 84439 ASSAY OF FREE THYROXINE: CPT | Performed by: INTERNAL MEDICINE

## 2021-08-02 PROCEDURE — 99396 PREV VISIT EST AGE 40-64: CPT | Mod: S$GLB,,, | Performed by: INTERNAL MEDICINE

## 2021-08-02 PROCEDURE — 84443 ASSAY THYROID STIM HORMONE: CPT | Performed by: INTERNAL MEDICINE

## 2021-08-02 PROCEDURE — 3075F SYST BP GE 130 - 139MM HG: CPT | Mod: CPTII,S$GLB,, | Performed by: INTERNAL MEDICINE

## 2021-08-02 PROCEDURE — 80053 COMPREHEN METABOLIC PANEL: CPT | Performed by: INTERNAL MEDICINE

## 2021-08-02 PROCEDURE — 3008F BODY MASS INDEX DOCD: CPT | Mod: CPTII,S$GLB,, | Performed by: INTERNAL MEDICINE

## 2021-08-02 PROCEDURE — 1126F PR PAIN SEVERITY QUANTIFIED, NO PAIN PRESENT: ICD-10-PCS | Mod: CPTII,S$GLB,, | Performed by: INTERNAL MEDICINE

## 2021-08-02 PROCEDURE — 3075F PR MOST RECENT SYSTOLIC BLOOD PRESS GE 130-139MM HG: ICD-10-PCS | Mod: CPTII,S$GLB,, | Performed by: INTERNAL MEDICINE

## 2021-08-02 PROCEDURE — 1160F PR REVIEW ALL MEDS BY PRESCRIBER/CLIN PHARMACIST DOCUMENTED: ICD-10-PCS | Mod: CPTII,S$GLB,, | Performed by: INTERNAL MEDICINE

## 2021-08-02 RX ORDER — ZOSTER VACCINE RECOMBINANT, ADJUVANTED 50 MCG/0.5
0.5 KIT INTRAMUSCULAR ONCE
Qty: 1 EACH | Refills: 1 | Status: SHIPPED | OUTPATIENT
Start: 2021-08-02 | End: 2021-08-02

## 2021-08-02 RX ORDER — AMLODIPINE BESYLATE 5 MG/1
5 TABLET ORAL DAILY
Qty: 90 TABLET | Refills: 3 | Status: SHIPPED | OUTPATIENT
Start: 2021-08-02 | End: 2022-08-18

## 2021-08-02 RX ORDER — ATORVASTATIN CALCIUM 40 MG/1
40 TABLET, FILM COATED ORAL DAILY
Qty: 90 TABLET | Refills: 3 | Status: SHIPPED | OUTPATIENT
Start: 2021-08-02 | End: 2022-08-18

## 2021-08-03 DIAGNOSIS — E03.8 SUBCLINICAL HYPOTHYROIDISM: Primary | ICD-10-CM

## 2021-08-03 LAB
COMPLEXED PSA SERPL-MCNC: 0.5 NG/ML (ref 0–4)
ESTIMATED AVG GLUCOSE: 114 MG/DL (ref 68–131)
HBA1C MFR BLD: 5.6 % (ref 4–5.6)

## 2021-08-04 ENCOUNTER — TELEPHONE (OUTPATIENT)
Dept: INTERNAL MEDICINE | Facility: CLINIC | Age: 61
End: 2021-08-04

## 2021-09-27 ENCOUNTER — LAB VISIT (OUTPATIENT)
Dept: LAB | Facility: HOSPITAL | Age: 61
End: 2021-09-27
Attending: INTERNAL MEDICINE
Payer: COMMERCIAL

## 2021-09-27 DIAGNOSIS — E03.8 SUBCLINICAL HYPOTHYROIDISM: ICD-10-CM

## 2021-09-27 LAB
T4 FREE SERPL-MCNC: 0.79 NG/DL (ref 0.71–1.51)
TSH SERPL DL<=0.005 MIU/L-ACNC: 5.42 UIU/ML (ref 0.4–4)

## 2021-09-27 PROCEDURE — 84439 ASSAY OF FREE THYROXINE: CPT | Performed by: INTERNAL MEDICINE

## 2021-09-27 PROCEDURE — 84443 ASSAY THYROID STIM HORMONE: CPT | Performed by: INTERNAL MEDICINE

## 2021-09-27 PROCEDURE — 36415 COLL VENOUS BLD VENIPUNCTURE: CPT | Mod: PO | Performed by: INTERNAL MEDICINE

## 2021-09-28 ENCOUNTER — TELEPHONE (OUTPATIENT)
Dept: INTERNAL MEDICINE | Facility: CLINIC | Age: 61
End: 2021-09-28

## 2021-11-18 ENCOUNTER — NURSE TRIAGE (OUTPATIENT)
Dept: ADMINISTRATIVE | Facility: CLINIC | Age: 61
End: 2021-11-18
Payer: COMMERCIAL

## 2022-08-18 DIAGNOSIS — E78.5 HYPERLIPIDEMIA, UNSPECIFIED HYPERLIPIDEMIA TYPE: ICD-10-CM

## 2022-08-18 DIAGNOSIS — I10 ESSENTIAL HYPERTENSION: ICD-10-CM

## 2022-08-18 RX ORDER — AMLODIPINE BESYLATE 5 MG/1
TABLET ORAL
Qty: 90 TABLET | Refills: 0 | Status: SHIPPED | OUTPATIENT
Start: 2022-08-18 | End: 2022-11-20

## 2022-08-18 RX ORDER — ATORVASTATIN CALCIUM 40 MG/1
TABLET, FILM COATED ORAL
Qty: 90 TABLET | Refills: 0 | Status: SHIPPED | OUTPATIENT
Start: 2022-08-18 | End: 2022-11-20

## 2022-08-18 NOTE — TELEPHONE ENCOUNTER
Care Due:                  Date            Visit Type   Department     Provider  --------------------------------------------------------------------------------                                EP -                              PRIMARY      [facility] INTERNAL  Last Visit: 08-      CARE ([facility])   MEDICINE       Joyce Armando                              [facility]                              PRIMARY      [facility] INTERNAL  Next Visit: 12-      [facility] ([place]       Joyce Armando                                                            Last  Test          Frequency    Reason                     Performed    Due Date  --------------------------------------------------------------------------------    Office Visit  12 months..  amLODIPine, atorvastatin.  08- 07-    CMP.........  12 months..  atorvastatin.............  08- 07-    Lipid Panel.  12 months..  atorvastatin.............  08- 07-    [facility] Embedded Care Gaps. Reference number: [id number]. 8/18/2022   1:45:32 AM CDT

## 2022-08-18 NOTE — TELEPHONE ENCOUNTER
Refill Routing Note   Medication(s) are not appropriate for processing by [facility] for the following reason(s):      - Required laboratory values are outdated  - Required vitals are outdated    [facility] action(s):  Defer Medication-related problems identified:   Requires appointment  Requires labs        Medication reconciliation completed: No     Appointments  past 12m or future 3m with PCP    Date Provider   Last Visit   8/2/2021 Joyce Armando MD   Next Visit   12/6/2022 Joyce Armando MD   ED visits in past 90 days: 0        Note composed:10:11 AM 08/18/2022

## 2022-08-31 DIAGNOSIS — I10 HYPERTENSION: ICD-10-CM

## 2022-12-06 ENCOUNTER — OFFICE VISIT (OUTPATIENT)
Dept: FAMILY MEDICINE | Facility: CLINIC | Age: 62
End: 2022-12-06
Payer: COMMERCIAL

## 2022-12-06 ENCOUNTER — LAB VISIT (OUTPATIENT)
Dept: LAB | Facility: HOSPITAL | Age: 62
End: 2022-12-06
Attending: FAMILY MEDICINE
Payer: COMMERCIAL

## 2022-12-06 VITALS
HEIGHT: 67 IN | DIASTOLIC BLOOD PRESSURE: 84 MMHG | SYSTOLIC BLOOD PRESSURE: 138 MMHG | OXYGEN SATURATION: 97 % | HEART RATE: 82 BPM | WEIGHT: 201.75 LBS | BODY MASS INDEX: 31.66 KG/M2

## 2022-12-06 DIAGNOSIS — Z12.5 PROSTATE CANCER SCREENING: ICD-10-CM

## 2022-12-06 DIAGNOSIS — E78.5 HYPERLIPIDEMIA, UNSPECIFIED HYPERLIPIDEMIA TYPE: ICD-10-CM

## 2022-12-06 DIAGNOSIS — J45.909 ASTHMA, UNSPECIFIED ASTHMA SEVERITY, UNSPECIFIED WHETHER COMPLICATED, UNSPECIFIED WHETHER PERSISTENT: ICD-10-CM

## 2022-12-06 DIAGNOSIS — Z12.11 ENCOUNTER FOR SCREENING COLONOSCOPY: ICD-10-CM

## 2022-12-06 DIAGNOSIS — Z76.89 ENCOUNTER TO ESTABLISH CARE WITH NEW DOCTOR: Primary | ICD-10-CM

## 2022-12-06 DIAGNOSIS — Z11.3 SCREENING EXAMINATION FOR STD (SEXUALLY TRANSMITTED DISEASE): ICD-10-CM

## 2022-12-06 DIAGNOSIS — I10 ESSENTIAL HYPERTENSION: ICD-10-CM

## 2022-12-06 DIAGNOSIS — Z23 ENCOUNTER FOR IMMUNIZATION: ICD-10-CM

## 2022-12-06 DIAGNOSIS — E66.09 CLASS 1 OBESITY DUE TO EXCESS CALORIES WITH SERIOUS COMORBIDITY AND BODY MASS INDEX (BMI) OF 31.0 TO 31.9 IN ADULT: ICD-10-CM

## 2022-12-06 DIAGNOSIS — Z76.89 ENCOUNTER TO ESTABLISH CARE WITH NEW DOCTOR: ICD-10-CM

## 2022-12-06 DIAGNOSIS — I10 HYPERTENSION: ICD-10-CM

## 2022-12-06 LAB
ALBUMIN SERPL BCP-MCNC: 3.9 G/DL (ref 3.5–5.2)
ALBUMIN SERPL BCP-MCNC: 3.9 G/DL (ref 3.5–5.2)
ALP SERPL-CCNC: 129 U/L (ref 55–135)
ALP SERPL-CCNC: 129 U/L (ref 55–135)
ALT SERPL W/O P-5'-P-CCNC: 30 U/L (ref 10–44)
ALT SERPL W/O P-5'-P-CCNC: 30 U/L (ref 10–44)
ANION GAP SERPL CALC-SCNC: 12 MMOL/L (ref 8–16)
ANION GAP SERPL CALC-SCNC: 12 MMOL/L (ref 8–16)
AST SERPL-CCNC: 30 U/L (ref 10–40)
AST SERPL-CCNC: 30 U/L (ref 10–40)
BASOPHILS # BLD AUTO: 0.08 K/UL (ref 0–0.2)
BASOPHILS NFR BLD: 0.9 % (ref 0–1.9)
BILIRUB SERPL-MCNC: 0.9 MG/DL (ref 0.1–1)
BILIRUB SERPL-MCNC: 0.9 MG/DL (ref 0.1–1)
BUN SERPL-MCNC: 14 MG/DL (ref 8–23)
BUN SERPL-MCNC: 14 MG/DL (ref 8–23)
CALCIUM SERPL-MCNC: 9.9 MG/DL (ref 8.7–10.5)
CALCIUM SERPL-MCNC: 9.9 MG/DL (ref 8.7–10.5)
CHLORIDE SERPL-SCNC: 104 MMOL/L (ref 95–110)
CHLORIDE SERPL-SCNC: 104 MMOL/L (ref 95–110)
CO2 SERPL-SCNC: 24 MMOL/L (ref 23–29)
CO2 SERPL-SCNC: 24 MMOL/L (ref 23–29)
COMPLEXED PSA SERPL-MCNC: 0.62 NG/ML (ref 0–4)
CREAT SERPL-MCNC: 1.1 MG/DL (ref 0.5–1.4)
CREAT SERPL-MCNC: 1.1 MG/DL (ref 0.5–1.4)
DIFFERENTIAL METHOD: ABNORMAL
EOSINOPHIL # BLD AUTO: 0.3 K/UL (ref 0–0.5)
EOSINOPHIL NFR BLD: 2.9 % (ref 0–8)
ERYTHROCYTE [DISTWIDTH] IN BLOOD BY AUTOMATED COUNT: 12.7 % (ref 11.5–14.5)
EST. GFR  (NO RACE VARIABLE): >60 ML/MIN/1.73 M^2
EST. GFR  (NO RACE VARIABLE): >60 ML/MIN/1.73 M^2
ESTIMATED AVG GLUCOSE: 120 MG/DL (ref 68–131)
GLUCOSE SERPL-MCNC: 97 MG/DL (ref 70–110)
GLUCOSE SERPL-MCNC: 97 MG/DL (ref 70–110)
HBA1C MFR BLD: 5.8 % (ref 4–5.6)
HCT VFR BLD AUTO: 48 % (ref 40–54)
HGB BLD-MCNC: 16.1 G/DL (ref 14–18)
HIV 1+2 AB+HIV1 P24 AG SERPL QL IA: NORMAL
IMM GRANULOCYTES # BLD AUTO: 0.03 K/UL (ref 0–0.04)
IMM GRANULOCYTES NFR BLD AUTO: 0.3 % (ref 0–0.5)
LYMPHOCYTES # BLD AUTO: 1.1 K/UL (ref 1–4.8)
LYMPHOCYTES NFR BLD: 12.1 % (ref 18–48)
MCH RBC QN AUTO: 32.1 PG (ref 27–31)
MCHC RBC AUTO-ENTMCNC: 33.5 G/DL (ref 32–36)
MCV RBC AUTO: 96 FL (ref 82–98)
MONOCYTES # BLD AUTO: 0.8 K/UL (ref 0.3–1)
MONOCYTES NFR BLD: 8.6 % (ref 4–15)
NEUTROPHILS # BLD AUTO: 6.9 K/UL (ref 1.8–7.7)
NEUTROPHILS NFR BLD: 75.2 % (ref 38–73)
NRBC BLD-RTO: 0 /100 WBC
PLATELET # BLD AUTO: 208 K/UL (ref 150–450)
PMV BLD AUTO: 10.9 FL (ref 9.2–12.9)
POTASSIUM SERPL-SCNC: 4.5 MMOL/L (ref 3.5–5.1)
POTASSIUM SERPL-SCNC: 4.5 MMOL/L (ref 3.5–5.1)
PROT SERPL-MCNC: 7.5 G/DL (ref 6–8.4)
PROT SERPL-MCNC: 7.5 G/DL (ref 6–8.4)
RBC # BLD AUTO: 5.01 M/UL (ref 4.6–6.2)
SODIUM SERPL-SCNC: 140 MMOL/L (ref 136–145)
SODIUM SERPL-SCNC: 140 MMOL/L (ref 136–145)
TSH SERPL DL<=0.005 MIU/L-ACNC: 2.05 UIU/ML (ref 0.4–4)
WBC # BLD AUTO: 9.23 K/UL (ref 3.9–12.7)

## 2022-12-06 PROCEDURE — 87389 HIV-1 AG W/HIV-1&-2 AB AG IA: CPT | Performed by: FAMILY MEDICINE

## 2022-12-06 PROCEDURE — 99999 PR PBB SHADOW E&M-EST. PATIENT-LVL III: ICD-10-PCS | Mod: PBBFAC,,, | Performed by: FAMILY MEDICINE

## 2022-12-06 PROCEDURE — 3079F DIAST BP 80-89 MM HG: CPT | Mod: CPTII,S$GLB,, | Performed by: FAMILY MEDICINE

## 2022-12-06 PROCEDURE — 99214 PR OFFICE/OUTPT VISIT, EST, LEVL IV, 30-39 MIN: ICD-10-PCS | Mod: 25,S$GLB,, | Performed by: FAMILY MEDICINE

## 2022-12-06 PROCEDURE — 3008F BODY MASS INDEX DOCD: CPT | Mod: CPTII,S$GLB,, | Performed by: FAMILY MEDICINE

## 2022-12-06 PROCEDURE — 80053 COMPREHEN METABOLIC PANEL: CPT | Performed by: INTERNAL MEDICINE

## 2022-12-06 PROCEDURE — 3075F PR MOST RECENT SYSTOLIC BLOOD PRESS GE 130-139MM HG: ICD-10-PCS | Mod: CPTII,S$GLB,, | Performed by: FAMILY MEDICINE

## 2022-12-06 PROCEDURE — 84443 ASSAY THYROID STIM HORMONE: CPT | Performed by: FAMILY MEDICINE

## 2022-12-06 PROCEDURE — 90471 FLU VACCINE (QUAD) GREATER THAN OR EQUAL TO 3YO PRESERVATIVE FREE IM: ICD-10-PCS | Mod: S$GLB,,, | Performed by: FAMILY MEDICINE

## 2022-12-06 PROCEDURE — 90471 IMMUNIZATION ADMIN: CPT | Mod: S$GLB,,, | Performed by: FAMILY MEDICINE

## 2022-12-06 PROCEDURE — 83036 HEMOGLOBIN GLYCOSYLATED A1C: CPT | Performed by: FAMILY MEDICINE

## 2022-12-06 PROCEDURE — 1159F PR MEDICATION LIST DOCUMENTED IN MEDICAL RECORD: ICD-10-PCS | Mod: CPTII,S$GLB,, | Performed by: FAMILY MEDICINE

## 2022-12-06 PROCEDURE — 3008F PR BODY MASS INDEX (BMI) DOCUMENTED: ICD-10-PCS | Mod: CPTII,S$GLB,, | Performed by: FAMILY MEDICINE

## 2022-12-06 PROCEDURE — 99214 OFFICE O/P EST MOD 30 MIN: CPT | Mod: 25,S$GLB,, | Performed by: FAMILY MEDICINE

## 2022-12-06 PROCEDURE — 90686 IIV4 VACC NO PRSV 0.5 ML IM: CPT | Mod: S$GLB,,, | Performed by: FAMILY MEDICINE

## 2022-12-06 PROCEDURE — 1159F MED LIST DOCD IN RCRD: CPT | Mod: CPTII,S$GLB,, | Performed by: FAMILY MEDICINE

## 2022-12-06 PROCEDURE — 84153 ASSAY OF PSA TOTAL: CPT | Performed by: FAMILY MEDICINE

## 2022-12-06 PROCEDURE — 85025 COMPLETE CBC W/AUTO DIFF WBC: CPT | Performed by: FAMILY MEDICINE

## 2022-12-06 PROCEDURE — 90686 FLU VACCINE (QUAD) GREATER THAN OR EQUAL TO 3YO PRESERVATIVE FREE IM: ICD-10-PCS | Mod: S$GLB,,, | Performed by: FAMILY MEDICINE

## 2022-12-06 PROCEDURE — 36415 COLL VENOUS BLD VENIPUNCTURE: CPT | Performed by: FAMILY MEDICINE

## 2022-12-06 PROCEDURE — 99999 PR PBB SHADOW E&M-EST. PATIENT-LVL III: CPT | Mod: PBBFAC,,, | Performed by: FAMILY MEDICINE

## 2022-12-06 PROCEDURE — 3075F SYST BP GE 130 - 139MM HG: CPT | Mod: CPTII,S$GLB,, | Performed by: FAMILY MEDICINE

## 2022-12-06 PROCEDURE — 3079F PR MOST RECENT DIASTOLIC BLOOD PRESSURE 80-89 MM HG: ICD-10-PCS | Mod: CPTII,S$GLB,, | Performed by: FAMILY MEDICINE

## 2022-12-06 RX ORDER — AMLODIPINE BESYLATE 5 MG/1
5 TABLET ORAL DAILY
Qty: 90 TABLET | Refills: 2 | Status: SHIPPED | OUTPATIENT
Start: 2022-12-06 | End: 2023-09-28

## 2022-12-06 RX ORDER — ATORVASTATIN CALCIUM 40 MG/1
40 TABLET, FILM COATED ORAL DAILY
Qty: 90 TABLET | Refills: 2 | Status: SHIPPED | OUTPATIENT
Start: 2022-12-06

## 2022-12-06 RX ORDER — ALBUTEROL SULFATE 90 UG/1
1 AEROSOL, METERED RESPIRATORY (INHALATION) EVERY 6 HOURS PRN
Qty: 18 G | Refills: 1 | Status: SHIPPED | OUTPATIENT
Start: 2022-12-06 | End: 2022-12-06

## 2022-12-06 NOTE — PROGRESS NOTES
(Portions of this note were dictated using voice recognition software and may contain dictation related errors in spelling/grammar/syntax not found on text review)    CC:   Chief Complaint   Patient presents with    [facility]       HPI: 62 y.o. male presented to [facility] with new doctor.  He has medical history significant for exercise-induced asthma, hypertension, hyperlipidemia, obesity, allergic rhinitis.  Patient reports taking amlodipine 5 mg and atorvastatin 40 mg, he reports adherence with medications, needs medication refills.  He does not smoke, drinks alcohol on the weekend.  He does not do regular exercise, physically active for his job, remains on his feet mostly.  He is due for annual labs, had lipid panel done for employer in June.  He needs flu vaccine.  He is also due for colonoscopy.  He is interested to have STD screening, denies having any symptoms.  He denies having any other symptoms or concerns.    Past Medical History:   Diagnosis Date    ALLERGIC RHINITIS     Bronchial asthma     Hyperlipemia     Hyperlipidemia     Hypertension     not on medication    Obesity        Past Surgical History:   Procedure Laterality Date    Repair ruptured left biceps  2004       Family History   Problem Relation Age of Onset    Coronary artery disease Father     Heart disease Father     Diabetes Mother     Heart disease Mother     Hypertension Mother     Diabetes Sister     Hypertension Brother        Social History     Tobacco Use    Smoking status: Never    Smokeless tobacco: Never   Substance Use Topics    Alcohol use: Yes     Alcohol/week: 2.5 standard drinks     Types: 3 Standard drinks or equivalent per week     Comment: 2x/week    Drug use: No       Lab Results   Component Value Date    WBC 10.59 08/02/2021    HGB 15.4 08/02/2021    HCT 44.3 08/02/2021    MCV 97 08/02/2021     08/02/2021    CHOL 169 08/02/2021    TRIG 76 08/02/2021    HDL 56 08/02/2021    ALT 29 08/02/2021    AST 34  08/02/2021    BILITOT 0.5 08/02/2021    ALKPHOS 115 08/02/2021     08/02/2021    K 4.9 08/02/2021     08/02/2021    CREATININE 1.1 08/02/2021    ESTGFRAFRICA >60.0 08/02/2021    EGFRNONAA >60.0 08/02/2021    CALCIUM 9.6 08/02/2021    ALBUMIN 3.9 08/02/2021    BUN 14 08/02/2021    CO2 26 08/02/2021    TSH 5.415 (H) 09/27/2021    PSA 0.50 08/02/2021    HGBA1C 5.6 08/02/2021    LDLCALC 97.8 08/02/2021    GLU 83 08/02/2021    IDMEVGEK00PI 39 03/15/2018             Vital signs reviewed  PE:   APPEARANCE: Well nourished, well developed, in no acute distress.    HEAD: Normocephalic, atraumatic.  EYES: EOMI.  Conjunctivae noninjected.  NECK: Supple with no cervical lymphadenopathy.    CHEST: Good inspiratory effort. Lungs clear to auscultation with no wheezes or crackles.  CARDIOVASCULAR: Normal S1, S2. No rubs, murmurs, or gallops.  ABDOMEN: Bowel sounds normal. Not distended. Soft. No tenderness or masses. No organomegaly.  EXTREMITIES: No edema, cyanosis, or clubbing.    Review of Systems   Constitutional:  Negative for chills, fatigue and fever.   HENT: Negative.     Respiratory:  Negative for cough, shortness of breath and wheezing.    Cardiovascular:  Negative for chest pain, palpitations and leg swelling.   Gastrointestinal: Negative.    Genitourinary: Negative.    Musculoskeletal: Negative.    Neurological: Negative.    Psychiatric/Behavioral: Negative.     All other systems reviewed and are negative.    IMPRESSION  1. Encounter to establish care with new doctor    2. Prostate cancer screening    3. Screening examination for STD (sexually transmitted disease)    4. Encounter for screening colonoscopy    5. Essential hypertension    6. Hyperlipidemia, unspecified hyperlipidemia type    7. Asthma, unspecified asthma severity, unspecified whether complicated, unspecified whether persistent    8. Class 1 obesity due to excess calories with serious comorbidity and body mass index (BMI) of 31.0 to 31.9 in  adult    9. Encounter for immunization            PLAN      1. Encounter to establish care with new doctor  - CBC Auto Differential; Future  - Comprehensive Metabolic Panel; Future  - TSH; Future  - Hemoglobin A1C; Future      2. Prostate cancer screening    - PSA, Screening; Future        3. Screening examination for STD (sexually transmitted disease)    - C. trachomatis/N. gonorrhoeae by AMP DNA; Future  - HIV 1/2 Ag/Ab (4th Gen); Future      4. Encounter for screening colonoscopy    - Case Request Endoscopy: COLONOSCOPY      5. Essential hypertension  Stable  - amLODIPine (NORVASC) 5 MG tablet; Take 1 tablet (5 mg total) by mouth once daily.  Dispense: 90 tablet; Refill: 2        6. Hyperlipidemia, unspecified hyperlipidemia type  - atorvastatin (LIPITOR) 40 MG tablet; Take 1 tablet (40 mg total) by mouth once daily.  Dispense: 90 tablet; Refill: 2        7. Asthma, unspecified asthma severity, unspecified whether complicated, unspecified whether persistent    - albuterol (PROVENTIL/VENTOLIN HFA) 90 mcg/actuation inhaler; Inhale 1 puff into the lungs every 6 (six) hours as needed for Wheezing. Rescue  Dispense: 18 g; Refill: 1      8. Class 1 obesity due to excess calories with serious comorbidity and body mass index (BMI) of 31.0 to 31.9 in adult    BMI 31  Counseling provided on healthy lifestyle, encouraged to do moderate intensity regular exercise 30 minutes every day 5 days a week, to include vegetables and fruits and cut back on saturated fats and carbohydrates.       9. Encounter for immunization  - Influenza - Quadrivalent *Preferred* (6 months+) (PF)         SCREENINGS      Immunizations:   Flu vaccine today      Age/demographic appropriate health maintenance:    Health Maintenance Due   Topic Date Due    Shingles Vaccine (1 of 2) Never done    Colorectal Cancer Screening  07/24/2019    Hemoglobin A1c (Prediabetes)  08/02/2022    Influenza Vaccine (1) 09/01/2022             Ernesto Mooney   12/6/2022

## 2022-12-21 ENCOUNTER — TELEPHONE (OUTPATIENT)
Dept: ENDOSCOPY | Facility: HOSPITAL | Age: 62
End: 2022-12-21
Payer: COMMERCIAL

## 2022-12-21 RX ORDER — POLYETHYLENE GLYCOL 3350, SODIUM SULFATE ANHYDROUS, SODIUM BICARBONATE, SODIUM CHLORIDE, POTASSIUM CHLORIDE 236; 22.74; 6.74; 5.86; 2.97 G/4L; G/4L; G/4L; G/4L; G/4L
4 POWDER, FOR SOLUTION ORAL ONCE
Qty: 4000 ML | Refills: 0 | Status: SHIPPED | OUTPATIENT
Start: 2022-12-21 | End: 2022-12-21

## 2022-12-21 NOTE — TELEPHONE ENCOUNTER
Endoscopy Scheduling Questionnaire:         Are you taking any blood thinners? No                If Yes  Have you been on them for longer than one year?     2. Have you been diagnosed with Diverticulitis in past three months?      3. Are you on dialysis or have Kidney Disease? no    4. Previous Colonoscopy?  Yes ( Does not remember Having)          If yes Do you have a history of colon polyps?        6. Are you a diabetic?  no    7. Do you have a history of constipation?  no      Procedure scheduled with Dr. Boland on  2/12/2023    The prep being used is golytely     The patient's prep instructions were sent by mail

## 2023-01-10 ENCOUNTER — TELEPHONE (OUTPATIENT)
Dept: GASTROENTEROLOGY | Facility: CLINIC | Age: 63
End: 2023-01-10
Payer: COMMERCIAL

## 2023-01-10 NOTE — TELEPHONE ENCOUNTER
Patient is scheduled for a colonoscopy on 2/17/23. I informed patient that Dr. Boland will not be scoping that day and that we have to get him rescheduled. Colon rescheduled to 2/24/23 with Dr. Andres.

## 2023-01-30 ENCOUNTER — PATIENT OUTREACH (OUTPATIENT)
Dept: ADMINISTRATIVE | Facility: HOSPITAL | Age: 63
End: 2023-01-30
Payer: COMMERCIAL

## 2023-02-22 ENCOUNTER — TELEPHONE (OUTPATIENT)
Dept: ENDOSCOPY | Facility: HOSPITAL | Age: 63
End: 2023-02-22
Payer: COMMERCIAL

## 2023-02-22 RX ORDER — POLYETHYLENE GLYCOL 3350, SODIUM SULFATE ANHYDROUS, SODIUM BICARBONATE, SODIUM CHLORIDE, POTASSIUM CHLORIDE 236; 22.74; 6.74; 5.86; 2.97 G/4L; G/4L; G/4L; G/4L; G/4L
4 POWDER, FOR SOLUTION ORAL ONCE
Qty: 4000 ML | Refills: 0 | Status: SHIPPED | OUTPATIENT
Start: 2023-02-22 | End: 2023-02-22

## 2023-02-22 NOTE — TELEPHONE ENCOUNTER
Spoke with patient about arrival time @ 1015.   Colon     Prep instructions reviewed: the day before the procedure, follow a clear liquid diet all day, then start the first 1/2 of prep at 5pm and take 2nd 1/2 of prep @ 0500.  Pt must be completely NPO when prep completed @ 0700.  Patient confirmed receipt of letter w/instructions for Golytely prep.             Medications: Do not take Insulin or oral diabetic medications the day of the procedure.  Take as prescribed: heart, seizure and blood pressure medication in the morning with a sip of water (less than an ounce).  Take any breathing medications and bring inhalers to hospital with you Leave all valuables and jewelry at home.     Wear comfortable clothes to procedure to change into hospital gown You cannot drive for 24 hours after your procedure because you will receive sedation for your procedure to make you comfortable.  A ride must be provided at discharge.

## 2023-02-23 ENCOUNTER — ANESTHESIA EVENT (OUTPATIENT)
Dept: ENDOSCOPY | Facility: HOSPITAL | Age: 63
End: 2023-02-23
Payer: COMMERCIAL

## 2023-02-24 ENCOUNTER — HOSPITAL ENCOUNTER (OUTPATIENT)
Facility: HOSPITAL | Age: 63
Discharge: HOME | End: 2023-02-24
Attending: INTERNAL MEDICINE | Admitting: INTERNAL MEDICINE
Payer: COMMERCIAL

## 2023-02-24 ENCOUNTER — ANESTHESIA (OUTPATIENT)
Dept: ENDOSCOPY | Facility: HOSPITAL | Age: 63
End: 2023-02-24
Payer: COMMERCIAL

## 2023-02-24 VITALS
TEMPERATURE: 98 F | OXYGEN SATURATION: 95 % | WEIGHT: 190 LBS | SYSTOLIC BLOOD PRESSURE: 128 MMHG | HEIGHT: 67 IN | HEART RATE: 65 BPM | BODY MASS INDEX: 29.82 KG/M2 | RESPIRATION RATE: 21 BRPM | DIASTOLIC BLOOD PRESSURE: 71 MMHG

## 2023-02-24 DIAGNOSIS — Z12.11 COLON CANCER SCREENING: ICD-10-CM

## 2023-02-24 PROCEDURE — 25000003 PHARM REV CODE 250: Performed by: INTERNAL MEDICINE

## 2023-02-24 PROCEDURE — 37000009 HC ANESTHESIA EA ADD 15 MINS: Performed by: INTERNAL MEDICINE

## 2023-02-24 PROCEDURE — G0121 COLON CA SCRN NOT HI RSK IND: HCPCS | Performed by: INTERNAL MEDICINE

## 2023-02-24 PROCEDURE — G0121 COLON CA SCRN NOT HI RSK IND: ICD-10-PCS | Mod: ,,, | Performed by: INTERNAL MEDICINE

## 2023-02-24 PROCEDURE — G0121 COLON CA SCRN NOT HI RSK IND: HCPCS | Mod: ,,, | Performed by: INTERNAL MEDICINE

## 2023-02-24 PROCEDURE — D9220A PRA ANESTHESIA: ICD-10-PCS | Mod: CRNA,,, | Performed by: NURSE ANESTHETIST, CERTIFIED REGISTERED

## 2023-02-24 PROCEDURE — D9220A PRA ANESTHESIA: Mod: ANES,,, | Performed by: ANESTHESIOLOGY

## 2023-02-24 PROCEDURE — D9220A PRA ANESTHESIA: ICD-10-PCS | Mod: ANES,,, | Performed by: ANESTHESIOLOGY

## 2023-02-24 PROCEDURE — D9220A PRA ANESTHESIA: Mod: CRNA,,, | Performed by: NURSE ANESTHETIST, CERTIFIED REGISTERED

## 2023-02-24 PROCEDURE — 25000003 PHARM REV CODE 250: Performed by: NURSE ANESTHETIST, CERTIFIED REGISTERED

## 2023-02-24 PROCEDURE — 63600175 PHARM REV CODE 636 W HCPCS: Performed by: NURSE ANESTHETIST, CERTIFIED REGISTERED

## 2023-02-24 PROCEDURE — 37000008 HC ANESTHESIA 1ST 15 MINUTES: Performed by: INTERNAL MEDICINE

## 2023-02-24 RX ORDER — PROPOFOL 10 MG/ML
VIAL (ML) INTRAVENOUS CONTINUOUS PRN
Status: DISCONTINUED | OUTPATIENT
Start: 2023-02-24 | End: 2023-02-24

## 2023-02-24 RX ORDER — SODIUM CHLORIDE 9 MG/ML
INJECTION, SOLUTION INTRAVENOUS CONTINUOUS
Status: DISCONTINUED | OUTPATIENT
Start: 2023-02-24 | End: 2023-02-24 | Stop reason: HOSPADM

## 2023-02-24 RX ORDER — SODIUM CHLORIDE 0.9 % (FLUSH) 0.9 %
10 SYRINGE (ML) INJECTION
Status: DISCONTINUED | OUTPATIENT
Start: 2023-02-24 | End: 2023-02-24 | Stop reason: HOSPADM

## 2023-02-24 RX ORDER — PROPOFOL 10 MG/ML
VIAL (ML) INTRAVENOUS
Status: DISCONTINUED | OUTPATIENT
Start: 2023-02-24 | End: 2023-02-24

## 2023-02-24 RX ORDER — LIDOCAINE HYDROCHLORIDE 20 MG/ML
INJECTION INTRAVENOUS
Status: DISCONTINUED | OUTPATIENT
Start: 2023-02-24 | End: 2023-02-24

## 2023-02-24 RX ADMIN — PROPOFOL 80 MG: 10 INJECTION, EMULSION INTRAVENOUS at 11:02

## 2023-02-24 RX ADMIN — PROPOFOL 150 MCG/KG/MIN: 10 INJECTION, EMULSION INTRAVENOUS at 11:02

## 2023-02-24 RX ADMIN — LIDOCAINE HYDROCHLORIDE 100 MG: 20 INJECTION, SOLUTION INTRAVENOUS at 11:02

## 2023-02-24 RX ADMIN — SODIUM CHLORIDE: 0.9 INJECTION, SOLUTION INTRAVENOUS at 10:02

## 2023-02-24 NOTE — ANESTHESIA PREPROCEDURE EVALUATION
[facility]  Anesthesia Pre-Operative Evaluation         Patient Name: Maxx Lo  YOB: 1960  MRN: [id number]    SUBJECTIVE:     02/24/2023    Procedure(s) (LRB):  COLONOSCOPY GOLYTELY (N/A)    Maxx Lo is a 62 y.o. male here for Procedure(s) (LRB):  COLONOSCOPY GOLYTELY (N/A)    Drips:     Patient Active Problem List   Diagnosis    Exercise-induced asthma    ALLERGIC RHINITIS    Hypertension    Hyperlipidemia    Obesity    Laceration of forehead    Rib pain    Closed fracture of multiple ribs of left side with routine healing    Chest pain at rest    Excessive daytime sleepiness    Prediabetes    Subclinical hypothyroidism       Review of patient's allergies indicates:  No Known Allergies    No current facility-administered medications on file prior to encounter.     Current Outpatient Medications on File Prior to Encounter   Medication Sig Dispense Refill    amLODIPine (NORVASC) 5 MG tablet Take 1 tablet (5 mg total) by mouth once daily. 90 tablet 2    atorvastatin (LIPITOR) 40 MG tablet Take 1 tablet (40 mg total) by mouth once daily. 90 tablet 2    fluticasone (FLOVENT HFA) 220 mcg/actuation inhaler Inhale 1 puff into the lungs 2 (two) times daily. Controller 12 g 1    krill/om-3/dha/epa/phospho/ast (MEGARED OMEGA-3 KRILL OIL ORAL) Take by mouth.      multivit with min-FA-lycopene 0.4-600 mg-mcg Tab Take by mouth. 1 Tablet Oral Every day         Past Surgical History:   Procedure Laterality Date    Repair ruptured left biceps  2004       Social History     Socioeconomic History    Marital status:    Occupational History    Occupation: mel   Tobacco Use    Smoking status: Never    Smokeless tobacco: Never   Substance and Sexual Activity    Alcohol use: Yes     Alcohol/week: 2.5 standard drinks     Types: 3 Standard drinks or equivalent  per week     Comment: 2x/week    Drug use: No    Sexual activity: Yes     Partners: Female         OBJECTIVE:     Vital Signs Range (Last 24H):  Temp:  [37.1 °C (98.8 °F)] 37.1 °C (98.8 °F)  Pulse:  [79] 79  Resp:  [18] 18  SpO2:  [96 %] 96 %  BP: (144)/(76) 144/76    Significant Labs:  Lab Results   Component Value Date    WBC 9.23 12/06/2022    HGB 16.1 12/06/2022    HCT 48.0 12/06/2022     12/06/2022    CHOL 169 08/02/2021    TRIG 76 08/02/2021    HDL 56 08/02/2021    ALT 30 12/06/2022    ALT 30 12/06/2022    AST 30 12/06/2022    AST 30 12/06/2022     12/06/2022     12/06/2022    K 4.5 12/06/2022    K 4.5 12/06/2022     12/06/2022     12/06/2022    CREATININE 1.1 12/06/2022    CREATININE 1.1 12/06/2022    BUN 14 12/06/2022    BUN 14 12/06/2022    CO2 24 12/06/2022    CO2 24 12/06/2022    TSH 2.046 12/06/2022    PSA 0.62 12/06/2022    HGBA1C 5.8 (H) 12/06/2022       Diagnostic Studies:    EKG:   Results for orders placed or performed in visit on 11/14/18   SCHEDULED EKG 12-LEAD (to Muse)    Collection Time: 11/14/18  9:38 AM    Narrative    Test Reason : R00.2,  Vent. Rate : 059 BPM     Atrial Rate : 059 BPM     P-R Int : 154 ms          QRS Dur : 124 ms      QT Int : 436 ms       P-R-T Axes : 021 -07 031 degrees     QTc Int : 431 ms    Sinus bradycardia  LVH with QRS widening  Abnormal ECG  When compared with ECG of 04-APR-2013 08:51,  Incomplete right bundle branch block is no longer Present  Confirmed by ESTRELLA ROE MD ([id number]) on 11/15/2018 2:40:13 PM    Referred By:  TOBIAS           Confirmed By:ESTRELLA ROE MD         Pre-op Assessment    I have reviewed the Patient Summary Reports.     I have reviewed the Nursing Notes. I have reviewed the NPO Status.   I have reviewed the Medications.     Review of Systems  Anesthesia Hx:  No problems with previous Anesthesia  History of prior surgery of interest to airway management or planning:  Denies Personal Hx of Anesthesia  complications.   Social:  Non-Smoker, Alcohol Use    Hematology/Oncology:  Hematology Normal   Oncology Normal     EENT/Dental:   chronic allergic rhinitis   Cardiovascular:   Exercise tolerance: good Hypertension Denies MI.   hyperlipidemia    Pulmonary:   Asthma    Renal/:  Renal/ Normal  Denies Chronic Renal Disease.     Hepatic/GI:   Denies GERD. Denies Liver Disease.    Neurological:  Neurology Normal Denies TIA.  Denies CVA.    Endocrine:   Hypothyroidism Pre DM per chart review  Denies Obesity / BMI > 30      Physical Exam  General: Well nourished, Cooperative, Alert and Oriented    Airway:  Mallampati: II           Anesthesia Plan  Type of Anesthesia, risks & benefits discussed:    Anesthesia Type: Gen Natural Airway  Intra-op Monitoring Plan: Standard ASA Monitors  Post Op Pain Control Plan: multimodal analgesia and IV/PO Opioids PRN  Induction:  IV  Informed Consent: Informed consent signed with the Patient and all parties understand the risks and agree with anesthesia plan.  All questions answered.   ASA Score: 2  Day of Surgery Review of History & Physical: H&P Update referred to the surgeon/provider.    Ready For Surgery From Anesthesia Perspective.     .

## 2023-02-24 NOTE — TRANSFER OF CARE
"Anesthesia Transfer of Care Note    Patient: Maxx Lo    Procedure(s) Performed: Procedure(s) (LRB):  COLONOSCOPY GOLYTELY (N/A)    Patient location: GI    Anesthesia Type: general    Transport from OR: Transported from OR on room air with adequate spontaneous ventilation    Post pain: adequate analgesia    Post assessment: no apparent anesthetic complications    Post vital signs: stable    Level of consciousness: awake, alert and oriented    Nausea/Vomiting: no nausea/vomiting    Complications: none    Transfer of care protocol was followed      Last vitals:   Visit Vitals  BP (!) 144/76 (BP Location: Left arm, Patient Position: Lying)   Pulse 79   Temp 37.1 °C (98.8 °F) (Skin)   Resp 18   Ht 5' 7" (1.702 m)   Wt 86.2 kg (190 lb)   SpO2 96%   BMI 29.76 kg/m²     "

## 2023-02-24 NOTE — ANESTHESIA POSTPROCEDURE EVALUATION
Anesthesia Post Evaluation    Patient: Maxx Lo    Procedure(s) Performed: Procedure(s) (LRB):  COLONOSCOPY GOLYTELY (N/A)    Final Anesthesia Type: general      Patient location during evaluation: PACU  Patient participation: Yes- Able to Participate  Level of consciousness: awake and alert  Post-procedure vital signs: reviewed and stable  Pain management: adequate  Airway patency: patent    PONV status at discharge: No PONV  Anesthetic complications: no      Cardiovascular status: stable  Respiratory status: spontaneous ventilation  Hydration status: euvolemic  Follow-up not needed.          Vitals Value Taken Time   /71 02/24/23 1200   Temp 36.8 °C (98.2 °F) 02/24/23 1130   Pulse 65 02/24/23 1200   Resp 21 02/24/23 1200   SpO2 95 % 02/24/23 1200         Event Time   Out of Recovery 12:00:00         Pain/Erick Score: Erick Score: 10 (2/24/2023 12:00 PM)

## 2023-02-24 NOTE — H&P
Short Stay Endoscopy History and Physical    PCP - Ernesto Mooney MD    Procedure - Colonoscopy  ASA - II  Mallampati - per anesthesia  History of Anesthesia problems - no  Family history Anesthesia problems - no     HPI:  This is a 62 y.o. male here for evaluation of : Colon cancer screening    Family history of colon cancer - no  History of polyps - na  Change in bowel habits - no  Rectal bleeding - no      ROS:  Constitutional: No fevers, chills, No weight loss  ENT: No allergies  CV: No chest pain  Pulm: No shortness of breath  GI: see HPI  Derm: No rash    Medical History:  has a past medical history of ALLERGIC RHINITIS, Bronchial asthma, Hyperlipemia, Hyperlipidemia, Hypertension, and Obesity.    Surgical History:  has a past surgical history that includes Repair ruptured left biceps (2004).    Family History: family history includes Coronary artery disease in his father; Diabetes in his mother and sister; Heart disease in his father and mother; Hypertension in his brother and mother.. Otherwise no colon cancer, inflammatory bowel disease, or GI malignancies.    Social History:  reports that he has never smoked. He has never used smokeless tobacco. He reports current alcohol use of about 2.5 standard drinks per week. He reports that he does not use drugs.    Review of patient's allergies indicates:  No Known Allergies    Medications:   Medications Prior to Admission   Medication Sig Dispense Refill Last Dose    amLODIPine (NORVASC) 5 MG tablet Take 1 tablet (5 mg total) by mouth once daily. 90 tablet 2 2/24/2023 at 0530    atorvastatin (LIPITOR) 40 MG tablet Take 1 tablet (40 mg total) by mouth once daily. 90 tablet 2 2/24/2023 at 0530    fluticasone (FLOVENT HFA) 220 mcg/actuation inhaler Inhale 1 puff into the lungs 2 (two) times daily. Controller 12 g 1 Past Week    levalbuterol (XOPENEX HFA) 45 mcg/actuation inhaler Inhale 1 puff into the lungs every 6 (six) hours as needed for Wheezing. 15 g 1 Past Week     krill/om-3/dha/epa/phospho/ast (MEGARED OMEGA-3 KRILL OIL ORAL) Take by mouth.   More than a month    multivit with min-FA-lycopene 0.4-600 mg-mcg Tab Take by mouth. 1 Tablet Oral Every day   2/21/2023         Objective Findings:    Vital Signs: see nursing notes  Physical Exam:  General Appearance: Well appearing in no acute distress  Eyes:    No scleral icterus  ENT: Neck supple  Lungs: CTA anteriorly  Heart:  S1, S2 normal, no murmurs heard  Abdomen: Soft, non tender, non distended with positive bowel sounds. No hepatosplenomegaly, ascites, or mass  Extremities: no edema  Skin: No rash      Labs:  Lab Results   Component Value Date    WBC 9.23 12/06/2022    HGB 16.1 12/06/2022    HCT 48.0 12/06/2022     12/06/2022    CHOL 169 08/02/2021    TRIG 76 08/02/2021    HDL 56 08/02/2021    ALT 30 12/06/2022    ALT 30 12/06/2022    AST 30 12/06/2022    AST 30 12/06/2022     12/06/2022     12/06/2022    K 4.5 12/06/2022    K 4.5 12/06/2022     12/06/2022     12/06/2022    CREATININE 1.1 12/06/2022    CREATININE 1.1 12/06/2022    BUN 14 12/06/2022    BUN 14 12/06/2022    CO2 24 12/06/2022    CO2 24 12/06/2022    TSH 2.046 12/06/2022    PSA 0.62 12/06/2022    HGBA1C 5.8 (H) 12/06/2022       I have explained the risks and benefits of endoscopy procedures to the patient including but not limited to bleeding, perforation, infection, and death.    Jose Manuel Andres MD

## 2023-02-24 NOTE — [employer]ATION PATIENT INSTRUCTIONS
Discharge Summary/Instructions after an Endoscopic Procedure  Patient Name: Maxx Lo  Patient MRN: [id number]  Patient YOB: 1960 Friday, February 24, 2023  Jose Manuel Andres MD  Dear patient,  As a result of recent federal legislation (The Federal Cures Act), you may   receive lab or pathology results from your procedure in your [facility]   account before your physician is able to contact you. Your physician or   their representative will relay the results to you with their   recommendations at their soonest availability.  Thank you,  Your health is very important to us during the Covid Crisis. Following your   procedure today, you will receive a daily text for 2 weeks asking about   signs or symptoms of Covid 19.  Please respond to this text when you   receive it so we can follow up and keep you as safe as possible.   RESTRICTIONS:  During your procedure today, you received medications for sedation.  These   medications may affect your judgment, balance and coordination.  Therefore,   for 24 hours, you have the following restrictions:   - DO NOT drive a car, operate machinery, make legal/financial decisions,   sign important papers or drink alcohol.    ACTIVITY:  Today: no heavy lifting, straining or running due to procedural   sedation/anesthesia.  The following day: return to full activity including work.  DIET:  Eat and drink normally unless instructed otherwise.     TREATMENT FOR COMMON SIDE EFFECTS:  - Mild abdominal pain, nausea, belching, bloating or excessive gas:  rest,   eat lightly and use a heating pad.  - Sore Throat: treat with throat lozenges and/or gargle with warm salt   water.  - Because air was used during the procedure, expelling large amounts of air   from your rectum or belching is normal.  - If a bowel prep was taken, you may not have a bowel movement for 1-3 days.    This is normal.  SYMPTOMS TO WATCH FOR AND REPORT TO YOUR PHYSICIAN:  1. Abdominal pain or bloating,  other than gas cramps.  2. Chest pain.  3. Back pain.  4. Signs of infection such as: chills or fever occurring within 24 hours   after the procedure.  5. Rectal bleeding, which would show as bright red, maroon, or black stools.   (A tablespoon of blood from the rectum is not serious, especially if   hemorrhoids are present.)  6. Vomiting.  7. Weakness or dizziness.  GO DIRECTLY TO THE NEAREST EMERGENCY ROOM IF YOU HAVE ANY OF THE FOLLOWING:      Difficulty breathing              Chills and/or fever over 101 F   Persistent vomiting and/or vomiting blood   Severe abdominal pain   Severe chest pain   Black, tarry stools   Bleeding- more than one tablespoon   Any other symptom or condition that you feel may need urgent attention  Your doctor recommends these additional instructions:  If any biopsies were taken, your doctors clinic will contact you in 1 to 2   weeks with any results.  - Discharge patient to home.   - Resume previous diet.   - Continue present medications.   - Repeat colonoscopy in 10 years for screening purposes.   - Patient has a contact number available for emergencies.  The signs and   symptoms of potential delayed complications were discussed with the   patient.  Return to normal activities tomorrow.  Written discharge   instructions were provided to the patient.  For questions, problems or results please call your physician - Jose Manuel Andres MD.  EMERGENCY PHONE NUMBER: [phone],  LAB RESULTS: [phone]  IF A COMPLICATION OR EMERGENCY SITUATION ARISES AND YOU ARE UNABLE TO REACH   YOUR PHYSICIAN - GO DIRECTLY TO THE EMERGENCY ROOM.  Jose Manuel Andres MD  2/24/2023 11:42:03 AM  This report has been verified and signed electronically.  Dear patient,  As a result of recent federal legislation (The Federal Cures Act), you may   receive lab or pathology results from your procedure in your [facility]   account before your physician is able to contact you. Your physician or    their representative will relay the results to you with their   recommendations at their soonest availability.  Thank you,  PROVATION

## 2023-08-17 ENCOUNTER — OFFICE VISIT (OUTPATIENT)
Dept: PRIMARY CARE CLINIC | Facility: CLINIC | Age: 63
End: 2023-08-17
Payer: COMMERCIAL

## 2023-08-17 VITALS
BODY MASS INDEX: 32.08 KG/M2 | DIASTOLIC BLOOD PRESSURE: 80 MMHG | WEIGHT: 204.38 LBS | SYSTOLIC BLOOD PRESSURE: 124 MMHG | TEMPERATURE: 99 F | OXYGEN SATURATION: 99 % | HEIGHT: 67 IN | HEART RATE: 78 BPM

## 2023-08-17 DIAGNOSIS — L23.7 POISON IVY DERMATITIS: Primary | ICD-10-CM

## 2023-08-17 PROCEDURE — 99999 PR PBB SHADOW E&M-EST. PATIENT-LVL IV: CPT | Mod: PBBFAC,,, | Performed by: STUDENT IN AN ORGANIZED HEALTH CARE EDUCATION/TRAINING PROGRAM

## 2023-08-17 PROCEDURE — 99214 PR OFFICE/OUTPT VISIT, EST, LEVL IV, 30-39 MIN: ICD-10-PCS | Mod: S$GLB,,, | Performed by: STUDENT IN AN ORGANIZED HEALTH CARE EDUCATION/TRAINING PROGRAM

## 2023-08-17 PROCEDURE — 99999 PR PBB SHADOW E&M-EST. PATIENT-LVL IV: ICD-10-PCS | Mod: PBBFAC,,, | Performed by: STUDENT IN AN ORGANIZED HEALTH CARE EDUCATION/TRAINING PROGRAM

## 2023-08-17 PROCEDURE — 1159F PR MEDICATION LIST DOCUMENTED IN MEDICAL RECORD: ICD-10-PCS | Mod: CPTII,S$GLB,, | Performed by: STUDENT IN AN ORGANIZED HEALTH CARE EDUCATION/TRAINING PROGRAM

## 2023-08-17 PROCEDURE — 3079F DIAST BP 80-89 MM HG: CPT | Mod: CPTII,S$GLB,, | Performed by: STUDENT IN AN ORGANIZED HEALTH CARE EDUCATION/TRAINING PROGRAM

## 2023-08-17 PROCEDURE — 1159F MED LIST DOCD IN RCRD: CPT | Mod: CPTII,S$GLB,, | Performed by: STUDENT IN AN ORGANIZED HEALTH CARE EDUCATION/TRAINING PROGRAM

## 2023-08-17 PROCEDURE — 3074F SYST BP LT 130 MM HG: CPT | Mod: CPTII,S$GLB,, | Performed by: STUDENT IN AN ORGANIZED HEALTH CARE EDUCATION/TRAINING PROGRAM

## 2023-08-17 PROCEDURE — 3008F BODY MASS INDEX DOCD: CPT | Mod: CPTII,S$GLB,, | Performed by: STUDENT IN AN ORGANIZED HEALTH CARE EDUCATION/TRAINING PROGRAM

## 2023-08-17 PROCEDURE — 3008F PR BODY MASS INDEX (BMI) DOCUMENTED: ICD-10-PCS | Mod: CPTII,S$GLB,, | Performed by: STUDENT IN AN ORGANIZED HEALTH CARE EDUCATION/TRAINING PROGRAM

## 2023-08-17 PROCEDURE — 99214 OFFICE O/P EST MOD 30 MIN: CPT | Mod: S$GLB,,, | Performed by: STUDENT IN AN ORGANIZED HEALTH CARE EDUCATION/TRAINING PROGRAM

## 2023-08-17 PROCEDURE — 3074F PR MOST RECENT SYSTOLIC BLOOD PRESSURE < 130 MM HG: ICD-10-PCS | Mod: CPTII,S$GLB,, | Performed by: STUDENT IN AN ORGANIZED HEALTH CARE EDUCATION/TRAINING PROGRAM

## 2023-08-17 PROCEDURE — 3079F PR MOST RECENT DIASTOLIC BLOOD PRESSURE 80-89 MM HG: ICD-10-PCS | Mod: CPTII,S$GLB,, | Performed by: STUDENT IN AN ORGANIZED HEALTH CARE EDUCATION/TRAINING PROGRAM

## 2023-08-17 RX ORDER — CLOBETASOL PROPIONATE 0.5 MG/ML
LOTION TOPICAL 2 TIMES DAILY
Qty: 118 ML | Refills: 1 | Status: SHIPPED | OUTPATIENT
Start: 2023-08-17

## 2023-08-17 NOTE — PROGRESS NOTES
"Primary Care  Return/Acute Office Visit - In Person  8/17/2023  Mwdomenicowe Ndhlovu      [facility]    Patient is a 62 y.o.   Maxx Lo  has a past medical history of ALLERGIC RHINITIS, Bronchial asthma, Hyperlipemia, Hyperlipidemia, Hypertension, and Obesity.    Patient presents with   Chief Complaint   Patient presents with    Poison Ivy     2 weeks ago, after pulling garden weeds     Patient reports that last week he was pulling weeds from his neighbors yard which is unkept and suspects he came into contact with poison ivy after taking off his shoes and socks. Rash spread to legs, arms, and cheek   He has been applying calamine lotion with little improvement     Social History     Social History Narrative    Not on file     Maxx Lo family history includes Coronary artery disease in his father; Diabetes in his mother and sister; Heart disease in his father and mother; Hypertension in his brother and mother.    Active Medications:  Review of patient's allergies indicates:  No Known Allergies  Current Outpatient Medications   Medication Instructions    amLODIPine (NORVASC) 5 mg, Oral, Daily    atorvastatin (LIPITOR) 40 mg, Oral, Daily    clobetasoL (CLOBEX) 0.05 % lotion Topical (Top), 2 times daily    fluticasone (FLOVENT HFA) 220 mcg/actuation inhaler 1 puff, Inhalation, 2 times daily, Controller    krill/om-3/dha/epa/phospho/ast (MEGARED OMEGA-3 KRILL OIL ORAL) Oral    levalbuterol (XOPENEX HFA) 45 mcg/actuation inhaler 1 puff, Inhalation, Every 6 hours PRN    multivit with min-FA-lycopene 0.4-600 mg-mcg Tab Take by mouth. 1 Tablet Oral Every day       Review of Systems   All other systems reviewed and are negative.      Vitals:    08/17/23 1604   BP: 124/80   BP Location: Left arm   Pulse: 78   Temp: 98.9 °F (37.2 °C)   SpO2: 99%   Weight: 92.7 kg (204 lb 5.9 oz)   Height: 5' 7" (1.702 m)       Physical Exam  Skin:     Comments: Rash poorly visualized patient has applied calamine lotion       "     Assessment and Plan     Poison ivy dermatitis   Recommended continued application of calamine lotion and oatmeal baths   Clobetasol prescribed               Upcoming Scheduled Appointments and Follow Up:    No future appointments.      Follow Up [facility] (with who? when?): Follow up in about 1 month (around 9/17/2023) for schedule annual in [facility].      Extended Emergency Contact Information  Primary Emergency Contact: BRAN WALLACE  Mobile Phone: [phone]  Relation: Brother      Rafatdomenicodee Vogel MD   Attending Physician  Primary Care  8/17/2023 - 4:08 PM    I spent a total of 22 minutes on the day of the visit.This includes face to face time and non-face to face time preparing to see the patient (eg, review of tests), obtaining and/or reviewing separately obtained history, documenting clinical information in the electronic or other health record, independently interpreting results and communicating results to the patient/family/caregiver, or care coordinator.

## 2023-09-28 DIAGNOSIS — I10 ESSENTIAL HYPERTENSION: ICD-10-CM

## 2023-09-28 RX ORDER — AMLODIPINE BESYLATE 5 MG/1
5 TABLET ORAL
Qty: 90 TABLET | Refills: 2 | Status: SHIPPED | OUTPATIENT
Start: 2023-09-28

## 2023-12-20 DIAGNOSIS — I10 HYPERTENSION: ICD-10-CM

## 2024-02-28 DIAGNOSIS — R73.03 PREDIABETES: ICD-10-CM

## 2024-06-29 ENCOUNTER — HOSPITAL ENCOUNTER (EMERGENCY)
Facility: HOSPITAL | Age: 64
Discharge: HOME | End: 2024-06-29
Attending: EMERGENCY MEDICINE
Payer: COMMERCIAL

## 2024-06-29 VITALS
TEMPERATURE: 99 F | HEIGHT: 67 IN | DIASTOLIC BLOOD PRESSURE: 99 MMHG | BODY MASS INDEX: 32.02 KG/M2 | RESPIRATION RATE: 16 BRPM | WEIGHT: 204 LBS | HEART RATE: 86 BPM | OXYGEN SATURATION: 98 % | SYSTOLIC BLOOD PRESSURE: 153 MMHG

## 2024-06-29 DIAGNOSIS — M79.605 LEG PAIN, BILATERAL: ICD-10-CM

## 2024-06-29 DIAGNOSIS — E78.5 HYPERLIPIDEMIA, UNSPECIFIED HYPERLIPIDEMIA TYPE: ICD-10-CM

## 2024-06-29 DIAGNOSIS — M79.604 LEG PAIN, BILATERAL: ICD-10-CM

## 2024-06-29 LAB
ALBUMIN SERPL BCP-MCNC: 3.3 G/DL (ref 3.5–5.2)
ALP SERPL-CCNC: 129 U/L (ref 55–135)
ALT SERPL W/O P-5'-P-CCNC: 17 U/L (ref 10–44)
ANION GAP SERPL CALC-SCNC: 10 MMOL/L (ref 8–16)
AST SERPL-CCNC: 22 U/L (ref 10–40)
BASOPHILS # BLD AUTO: 0.08 K/UL (ref 0–0.2)
BASOPHILS NFR BLD: 0.7 % (ref 0–1.9)
BILIRUB SERPL-MCNC: 0.5 MG/DL (ref 0.1–1)
BILIRUB UR QL STRIP: NEGATIVE
BUN SERPL-MCNC: 12 MG/DL (ref 8–23)
CALCIUM SERPL-MCNC: 10 MG/DL (ref 8.7–10.5)
CHLORIDE SERPL-SCNC: 104 MMOL/L (ref 95–110)
CK SERPL-CCNC: 92 U/L (ref 20–200)
CLARITY UR REFRACT.AUTO: CLEAR
CO2 SERPL-SCNC: 26 MMOL/L (ref 23–29)
COLOR UR AUTO: YELLOW
CREAT SERPL-MCNC: 1.1 MG/DL (ref 0.5–1.4)
DIFFERENTIAL METHOD BLD: ABNORMAL
EOSINOPHIL # BLD AUTO: 0.2 K/UL (ref 0–0.5)
EOSINOPHIL NFR BLD: 1.3 % (ref 0–8)
ERYTHROCYTE [DISTWIDTH] IN BLOOD BY AUTOMATED COUNT: 13.2 % (ref 11.5–14.5)
EST. GFR  (NO RACE VARIABLE): >60 ML/MIN/1.73 M^2
GLUCOSE SERPL-MCNC: 104 MG/DL (ref 70–110)
GLUCOSE UR QL STRIP: NEGATIVE
HCT VFR BLD AUTO: 46.8 % (ref 40–54)
HCV AB SERPL QL IA: NORMAL
HGB BLD-MCNC: 15.5 G/DL (ref 14–18)
HGB UR QL STRIP: NEGATIVE
HIV 1+2 AB+HIV1 P24 AG SERPL QL IA: NORMAL
IMM GRANULOCYTES # BLD AUTO: 0.05 K/UL (ref 0–0.04)
IMM GRANULOCYTES NFR BLD AUTO: 0.4 % (ref 0–0.5)
KETONES UR QL STRIP: NEGATIVE
LEUKOCYTE ESTERASE UR QL STRIP: NEGATIVE
LYMPHOCYTES # BLD AUTO: 1.2 K/UL (ref 1–4.8)
LYMPHOCYTES NFR BLD: 9.6 % (ref 18–48)
MCH RBC QN AUTO: 32.4 PG (ref 27–31)
MCHC RBC AUTO-ENTMCNC: 33.1 G/DL (ref 32–36)
MCV RBC AUTO: 98 FL (ref 82–98)
MONOCYTES # BLD AUTO: 1.4 K/UL (ref 0.3–1)
MONOCYTES NFR BLD: 11.2 % (ref 4–15)
NEUTROPHILS # BLD AUTO: 9.5 K/UL (ref 1.8–7.7)
NEUTROPHILS NFR BLD: 76.8 % (ref 38–73)
NITRITE UR QL STRIP: NEGATIVE
NRBC BLD-RTO: 0 /100 WBC
PH UR STRIP: 7 [PH] (ref 5–8)
PLATELET # BLD AUTO: 218 K/UL (ref 150–450)
PMV BLD AUTO: 10.9 FL (ref 9.2–12.9)
POTASSIUM SERPL-SCNC: 4.5 MMOL/L (ref 3.5–5.1)
PROT SERPL-MCNC: 7.5 G/DL (ref 6–8.4)
PROT UR QL STRIP: NEGATIVE
RBC # BLD AUTO: 4.78 M/UL (ref 4.6–6.2)
SODIUM SERPL-SCNC: 140 MMOL/L (ref 136–145)
SP GR UR STRIP: 1.02 (ref 1–1.03)
URN SPEC COLLECT METH UR: NORMAL
WBC # BLD AUTO: 12.3 K/UL (ref 3.9–12.7)

## 2024-06-29 PROCEDURE — 85025 COMPLETE CBC W/AUTO DIFF WBC: CPT | Performed by: EMERGENCY MEDICINE

## 2024-06-29 PROCEDURE — 82550 ASSAY OF CK (CPK): CPT | Performed by: EMERGENCY MEDICINE

## 2024-06-29 PROCEDURE — 87389 HIV-1 AG W/HIV-1&-2 AB AG IA: CPT | Performed by: PHYSICIAN ASSISTANT

## 2024-06-29 PROCEDURE — 81003 URINALYSIS AUTO W/O SCOPE: CPT | Performed by: EMERGENCY MEDICINE

## 2024-06-29 PROCEDURE — 80053 COMPREHEN METABOLIC PANEL: CPT | Performed by: EMERGENCY MEDICINE

## 2024-06-29 PROCEDURE — 86803 HEPATITIS C AB TEST: CPT | Performed by: PHYSICIAN ASSISTANT

## 2024-06-29 PROCEDURE — 99284 EMERGENCY DEPT VISIT MOD MDM: CPT | Mod: 25

## 2024-06-29 RX ORDER — ATORVASTATIN CALCIUM 40 MG/1
40 TABLET, FILM COATED ORAL DAILY
Qty: 30 TABLET | Refills: 0 | Status: SHIPPED | OUTPATIENT
Start: 2024-06-29 | End: 2024-07-29

## 2024-06-29 RX ORDER — IBUPROFEN 400 MG/1
400 TABLET ORAL EVERY 6 HOURS PRN
Qty: 20 TABLET | Refills: 0 | Status: SHIPPED | OUTPATIENT
Start: 2024-06-29

## 2024-06-29 RX ORDER — ACETAMINOPHEN 325 MG/1
650 TABLET ORAL EVERY 6 HOURS PRN
Qty: 30 TABLET | Refills: 0 | Status: SHIPPED | OUTPATIENT
Start: 2024-06-29

## 2024-06-29 NOTE — ED TRIAGE NOTES
"Maxx Lo, a 63 y.o. male presents to the ED w/ complaint of BLE pain, swelling and redness since Wednesday. Pt reports pain is a little worse when hanging down when sitting, "feels better when I put them up". Redness noted to bilateral ankles that is non-blanchable. Pedal pulses 2+. Denies CP, SOB, Fever.    Triage note:  Chief Complaint   Patient presents with    Leg Pain     Reports bilateral leg pain and redness since Wednesday     Review of patient's allergies indicates:  No Known Allergies  Past Medical History:   Diagnosis Date    ALLERGIC RHINITIS     Bronchial asthma     Hyperlipemia     Hyperlipidemia     Hypertension     not on medication    Obesity       "

## 2024-06-30 NOTE — ED PROVIDER NOTES
Encounter Date: 6/29/2024       History     Chief Complaint   Patient presents with    Leg Pain     Reports bilateral leg pain and redness since Wednesday     HPI  63-year-old male with past medical history as noted below coming in with bilateral leg pain since Thursday.  He says it hurts him to walk, improves slightly with elevating his legs.  Not better when taking his legs over the bed.  No falls or trauma.  Works as a  and drives frequently for 3 hours at a time.  He also noticed a rash in his lower leg that started around the same time on Thursday.  No new detergents, socks, medications.  Has not had anything like this before.  Denies chest pain, shortness breath, abdominal pain, other symptoms.  He took some kind of over-the-counter pain medication earlier today but is not sure what it is.  He declines pain medications in the ED.    Review of patient's allergies indicates:  No Known Allergies  Past Medical History:   Diagnosis Date    ALLERGIC RHINITIS     Bronchial asthma     Hyperlipemia     Hyperlipidemia     Hypertension     not on medication    Obesity      Past Surgical History:   Procedure Laterality Date    COLONOSCOPY N/A 2/24/2023    Procedure: COLONOSCOPY GOLYTELY;  Surgeon: Jose Manuel Andres MD;  Location: [facility];  Service: Endoscopy;  Laterality: N/A;    Repair ruptured left biceps  2004     Family History   Problem Relation Name Age of Onset    Coronary artery disease Father      Heart disease Father      Diabetes Mother      Heart disease Mother      Hypertension Mother      Diabetes Sister      Hypertension Brother       Social History     Tobacco Use    Smoking status: Never    Smokeless tobacco: Never   Substance Use Topics    Alcohol use: Yes     Alcohol/week: 2.5 standard drinks of alcohol     Types: 3 Standard drinks or equivalent per week     Comment: 2x/week    Drug use: No     Review of Systems    Physical Exam     Initial Vitals [06/29/24 1751]   BP Pulse Resp Temp SpO2    (!) 139/92 91 18 98.9 °F (37.2 °C) 96 %      MAP       --         Physical Exam    Physical Exam:  CONSTITUTIONAL: Well developed, well nourished, in no acute distress, calm  HENT: Normocephalic, atraumatic    EYES: Sclerae anicteric   NECK: Supple  RESPIRATORY: Breathing comfortably. Speaking in full sentences.  Lungs are clear with no rales, rhonchi, wheezes.  NEUROLOGIC: Alert, interacting normally. No facial droop.  Normal sensation to light touch in the feet bilaterally.  Stable gait slightly antalgic.  MSK: Mild discomfort with palpation to the calves.  Mildly swollen at the ankles.  Intact strength of the foot and knee.  There is no swelling at the knee, thigh.  Normal range of motion of the hips bilaterally.  VASC:  Intact pulses distally in the feet bilaterally.  SKIN:  There is a bilateral nonblanching rash to the lower leg.  Slightly warm to touch.  No significant tenderness.  No bullae or skin sloughing. No visible rash on other exposed areas of skin.    PSYCH: Mood and affect normal.               ED Course   Procedures  Labs Reviewed   CBC W/ AUTO DIFFERENTIAL - Abnormal; Notable for the following components:       Result Value    MCH 32.4 (*)     Gran # (ANC) 9.5 (*)     Immature Grans (Abs) 0.05 (*)     Mono # 1.4 (*)     Gran % 76.8 (*)     Lymph % 9.6 (*)     All other components within normal limits   COMPREHENSIVE METABOLIC PANEL - Abnormal; Notable for the following components:    Albumin 3.3 (*)     All other components within normal limits   HIV 1 / 2 ANTIBODY    Narrative:     Release to patient->Immediate   HEPATITIS C ANTIBODY    Narrative:     Release to patient->Immediate   CK   URINALYSIS          Imaging Results              US Lower Extremity Veins Bilateral (Final result)  Result time 06/29/24 21:03:28      Final result by Ismael Zapata MD (06/29/24 21:03:28)                   Impression:      No evidence of deep venous thrombosis in either lower extremity.      Electronically  signed by: Ismael Zapata  Date:    06/29/2024  Time:    21:03               Narrative:    EXAMINATION:  US LOWER EXTREMITY VEINS BILATERAL    CLINICAL HISTORY:  Pain in right leg    TECHNIQUE:  Duplex and color flow Doppler and dynamic compression was performed of the bilateral lower extremity veins was performed.    COMPARISON:  None    FINDINGS:  Right thigh veins: The common femoral, femoral, popliteal, upper greater saphenous, and deep femoral veins are patent and free of thrombus. The veins are normally compressible and have normal phasic flow and augmentation response.    Right calf veins: The visualized calf veins are patent.    Left thigh veins: The common femoral, femoral, popliteal, upper greater saphenous, and deep femoral veins are patent and free of thrombus. The veins are normally compressible and have normal phasic flow and augmentation response.    Left calf veins: The visualized calf veins are patent.    Miscellaneous: None                                       Medications - No data to display  Medical Decision Making  Amount and/or Complexity of Data Reviewed  Labs: ordered.    Risk  OTC drugs.  Prescription drug management.      61-year-old male with bilateral leg pain, mild swelling with a non blanchable rash around his lower leg.  Vascularly intact, sensation intact, no focal significant tenderness.  No deformities.  Mildly swollen.    Given nonblanching rash concern for petechia/purpura, concern for possible vasculitis low suspicion for a TTP but remains in the differential, no other accompanying symptoms.     DVTs on differential although low suspicion for it as it is bilateral in nature.    Given nonblanching rash, lack of significant increased warmth, lack of tenderness, bilateral nature at this time not consistent with leg infection.    Good pulses and cap refill, not consistent with significant PVD.    No chest pain, no shortness a breath, lungs are clear, vitals benign, swelling is mild,  low suspicion for acute or concerning heart failure as the primary cause of his leg pain/rash/mild swelling.    Will obtain labs to assure that platelets are not concerning as well as hemoglobin as well as kidney function.  UA to look for proteinuria.    Will obtain DVT ultrasound to rule out DVT.    If ED workup is unremarkable anticipate discharge home with outpatient for workup and ED return precautions.    Update:  In the ED he remains well-appearing and hemodynamically stable.    Labs are unremarkable.    No signs of protein in the urine, abnormal kidney function, anemia or thrombocytopenia.  No significant white count.  DVT ultrasound is negative.  CPK is unremarkable.    At this time no acute dangerous findings found on ED workup.  Unclear cause of patient's symptoms.  However this time not consistent with acutely dangerous pathology.    Plan for continued outpatient follow-up with primary care for outpatient workup.  Recommend leg elevation, compression stockings if standing for long periods of time, return precautions for any spreading redness, increased swelling, uncontrolled pain.    Tylenol and Motrin at home pain control.    Outpatient follow-up within 1 week and ED return precautions.    Findings of ED work up and return precautions verbally explained to patient. Patient agrees with discharge plan, return instructions and verbalizes understanding of return precautions.                                           Clinical Impression:  Final diagnoses:  [M79.604, M79.605] Leg pain, bilateral  [M79.604, M79.605] Leg pain, bilateral - mild swelling, calf pain, redness          ED Disposition Condition    Discharge Stable          ED Prescriptions       Medication Sig Dispense Start Date End Date Auth. Provider    acetaminophen (TYLENOL) 325 MG tablet Take 2 tablets (650 mg total) by mouth every 6 (six) hours as needed for Pain. 30 tablet 6/29/2024 -- Mane Burgos MD    ibuprofen (ADVIL,MOTRIN) 400 MG  tablet Take 1 tablet (400 mg total) by mouth every 6 (six) hours as needed (pain). 20 tablet 6/29/2024 -- Mane Burgos MD    atorvastatin (LIPITOR) 40 MG tablet Take 1 tablet (40 mg total) by mouth once daily. 30 tablet 6/29/2024 7/29/2024 Mane Burgos MD          Follow-up Information       Follow up With Specialties Details Why Contact Info    Ernesto Mooney MD Family Medicine In 1 week  [address]  [address]  [facility] [address] [phone]               Mane Burgos MD  06/30/24 6747

## 2024-06-30 NOTE — ED NOTES
Nurses Note -- 4 Eyes      6/29/2024   7:06 PM      Skin assessed during: Admit      [] No Altered Skin Integrity Present    []Prevention Measures Documented      [x] Yes- Altered Skin Integrity Present or Discovered   [x] LDA Added if Not in Epic (Describe Wound)   [x] New Altered Skin Integrity was Present on Admit and Documented in LDA   [x] Wound Image Taken    Wound Care Consulted? No    Attending Nurse:  Haseeb ROJAS    Second RN/Staff Member:   Aubrey SHOEMAKER

## 2024-06-30 NOTE — DISCHARGE INSTRUCTIONS
Your labs, ultrasound, physical exam did not show any signs of dangerous medical conditions.    At this time it is unclear what is causing your symptoms.    Please follow-up with your primary care doctor within 1 week for continued evaluation.    You develop worsening swelling, uncontrolled pain, spreading redness, fevers, or any other new, worsening worrisome symptoms please return to the emergency department for evaluation.    At home you can take Tylenol and or ibuprofen over-the-counter doses.

## 2024-07-01 ENCOUNTER — HOSPITAL ENCOUNTER (EMERGENCY)
Facility: HOSPITAL | Age: 64
Discharge: HOME | End: 2024-07-01
Attending: EMERGENCY MEDICINE
Payer: COMMERCIAL

## 2024-07-01 ENCOUNTER — NURSE TRIAGE (OUTPATIENT)
Dept: ADMINISTRATIVE | Facility: CLINIC | Age: 64
End: 2024-07-01
Payer: COMMERCIAL

## 2024-07-01 VITALS
RESPIRATION RATE: 25 BRPM | DIASTOLIC BLOOD PRESSURE: 69 MMHG | OXYGEN SATURATION: 96 % | BODY MASS INDEX: 29.76 KG/M2 | SYSTOLIC BLOOD PRESSURE: 129 MMHG | TEMPERATURE: 99 F | HEART RATE: 86 BPM | WEIGHT: 190 LBS

## 2024-07-01 DIAGNOSIS — J45.901 EXACERBATION OF ASTHMA, UNSPECIFIED ASTHMA SEVERITY, UNSPECIFIED WHETHER PERSISTENT: Primary | ICD-10-CM

## 2024-07-01 DIAGNOSIS — R21 RASH: ICD-10-CM

## 2024-07-01 LAB
ALBUMIN SERPL BCP-MCNC: 3.6 G/DL (ref 3.5–5.2)
ALP SERPL-CCNC: 118 U/L (ref 55–135)
ALT SERPL W/O P-5'-P-CCNC: 14 U/L (ref 10–44)
ANION GAP SERPL CALC-SCNC: 10 MMOL/L (ref 8–16)
AST SERPL-CCNC: 22 U/L (ref 10–40)
BASOPHILS # BLD AUTO: 0.06 K/UL (ref 0–0.2)
BASOPHILS NFR BLD: 0.5 % (ref 0–1.9)
BILIRUB SERPL-MCNC: 0.6 MG/DL (ref 0.1–1)
BUN SERPL-MCNC: 10 MG/DL (ref 8–23)
CALCIUM SERPL-MCNC: 9.8 MG/DL (ref 8.7–10.5)
CHLORIDE SERPL-SCNC: 102 MMOL/L (ref 95–110)
CO2 SERPL-SCNC: 27 MMOL/L (ref 23–29)
CREAT SERPL-MCNC: 1.1 MG/DL (ref 0.5–1.4)
DIFFERENTIAL METHOD BLD: ABNORMAL
EOSINOPHIL # BLD AUTO: 0.1 K/UL (ref 0–0.5)
EOSINOPHIL NFR BLD: 0.8 % (ref 0–8)
ERYTHROCYTE [DISTWIDTH] IN BLOOD BY AUTOMATED COUNT: 13.1 % (ref 11.5–14.5)
EST. GFR  (NO RACE VARIABLE): >60 ML/MIN/1.73 M^2
GLUCOSE SERPL-MCNC: 116 MG/DL (ref 70–110)
HCT VFR BLD AUTO: 43.8 % (ref 40–54)
HGB BLD-MCNC: 15 G/DL (ref 14–18)
IMM GRANULOCYTES # BLD AUTO: 0.05 K/UL (ref 0–0.04)
IMM GRANULOCYTES NFR BLD AUTO: 0.4 % (ref 0–0.5)
LYMPHOCYTES # BLD AUTO: 1 K/UL (ref 1–4.8)
LYMPHOCYTES NFR BLD: 8.1 % (ref 18–48)
MAGNESIUM SERPL-MCNC: 2.2 MG/DL (ref 1.6–2.6)
MCH RBC QN AUTO: 32.8 PG (ref 27–31)
MCHC RBC AUTO-ENTMCNC: 34.2 G/DL (ref 32–36)
MCV RBC AUTO: 96 FL (ref 82–98)
MONOCYTES # BLD AUTO: 0.9 K/UL (ref 0.3–1)
MONOCYTES NFR BLD: 7.3 % (ref 4–15)
NEUTROPHILS # BLD AUTO: 10.7 K/UL (ref 1.8–7.7)
NEUTROPHILS NFR BLD: 82.9 % (ref 38–73)
NRBC BLD-RTO: 0 /100 WBC
PLATELET # BLD AUTO: 237 K/UL (ref 150–450)
PMV BLD AUTO: 11 FL (ref 9.2–12.9)
POTASSIUM SERPL-SCNC: 4.2 MMOL/L (ref 3.5–5.1)
PROT SERPL-MCNC: 7.7 G/DL (ref 6–8.4)
RBC # BLD AUTO: 4.57 M/UL (ref 4.6–6.2)
SODIUM SERPL-SCNC: 139 MMOL/L (ref 136–145)
TROPONIN I SERPL DL<=0.01 NG/ML-MCNC: <0.006 NG/ML (ref 0–0.03)
WBC # BLD AUTO: 12.85 K/UL (ref 3.9–12.7)

## 2024-07-01 PROCEDURE — 93010 ELECTROCARDIOGRAM REPORT: CPT | Mod: ,,, | Performed by: INTERNAL MEDICINE

## 2024-07-01 PROCEDURE — 84484 ASSAY OF TROPONIN QUANT: CPT | Performed by: NURSE PRACTITIONER

## 2024-07-01 PROCEDURE — 63600175 PHARM REV CODE 636 W HCPCS: Performed by: EMERGENCY MEDICINE

## 2024-07-01 PROCEDURE — 94761 N-INVAS EAR/PLS OXIMETRY MLT: CPT

## 2024-07-01 PROCEDURE — 80053 COMPREHEN METABOLIC PANEL: CPT | Performed by: NURSE PRACTITIONER

## 2024-07-01 PROCEDURE — 25000242 PHARM REV CODE 250 ALT 637 W/ HCPCS: Performed by: EMERGENCY MEDICINE

## 2024-07-01 PROCEDURE — 94640 AIRWAY INHALATION TREATMENT: CPT

## 2024-07-01 PROCEDURE — 85025 COMPLETE CBC W/AUTO DIFF WBC: CPT | Performed by: NURSE PRACTITIONER

## 2024-07-01 PROCEDURE — 93005 ELECTROCARDIOGRAM TRACING: CPT

## 2024-07-01 PROCEDURE — 99285 EMERGENCY DEPT VISIT HI MDM: CPT | Mod: 25

## 2024-07-01 PROCEDURE — 83735 ASSAY OF MAGNESIUM: CPT | Performed by: EMERGENCY MEDICINE

## 2024-07-01 RX ORDER — IPRATROPIUM BROMIDE 0.5 MG/2.5ML
0.5 SOLUTION RESPIRATORY (INHALATION)
Status: COMPLETED | OUTPATIENT
Start: 2024-07-01 | End: 2024-07-01

## 2024-07-01 RX ORDER — PREDNISONE 50 MG/1
50 TABLET ORAL DAILY
Qty: 4 TABLET | Refills: 0 | Status: SHIPPED | OUTPATIENT
Start: 2024-07-02 | End: 2024-07-06

## 2024-07-01 RX ORDER — ALBUTEROL SULFATE 90 UG/1
1-2 AEROSOL, METERED RESPIRATORY (INHALATION) EVERY 6 HOURS PRN
Qty: 18 G | Refills: 0 | Status: SHIPPED | OUTPATIENT
Start: 2024-07-01 | End: 2025-07-01

## 2024-07-01 RX ORDER — ALBUTEROL SULFATE 2.5 MG/.5ML
10 SOLUTION RESPIRATORY (INHALATION)
Status: COMPLETED | OUTPATIENT
Start: 2024-07-01 | End: 2024-07-01

## 2024-07-01 RX ADMIN — IPRATROPIUM BROMIDE 0.5 MG: 0.5 SOLUTION RESPIRATORY (INHALATION) at 12:07

## 2024-07-01 RX ADMIN — ALBUTEROL SULFATE 10 MG: 2.5 SOLUTION RESPIRATORY (INHALATION) at 12:07

## 2024-07-01 RX ADMIN — PREDNISONE 50 MG: 20 TABLET ORAL at 01:07

## 2024-07-01 NOTE — ED TRIAGE NOTES
Seen at [facility] 6/29 for BLE pain and edema - US at that time negative for PE. States pain in BLE continues and now having exertional SOB, unrelieved with home inhaler which he uses prn to loosen secretions. Denies CP. Presents awake, alert. Resp unlabored and patient speaking in full sentences. No distress noted.

## 2024-07-01 NOTE — ED PROVIDER NOTES
Encounter Date: 7/1/2024       History     Chief Complaint   Patient presents with    Leg Pain     Bilateral leg pain since Thursday.     Shortness of Breath     Hx of asthma. Pt reports worsened when doing activities. No respiratory distress in triage      63-year-old male with past medical history including asthma, hypertension, hyperlipidemia, obesity, allergic rhinitis presents with complaint of shortness of breath.  Patient says that on Sunday, yesterday, he had onset of increased shortness of breath compared with his baseline.  He says that he was cutting the grass and had to take a break between the front and back yard which isn't typical for him.  He says that he has ongoing unchanged lower extremity pain for which he was already evaluated in the ER this past Friday, says there has been no change in his legs since that visit. Denies a hx of smoking but says his mother smoked. Denies being prescribed any steroids recently.     The history is provided by the patient.     Review of patient's allergies indicates:  No Known Allergies  Past Medical History:   Diagnosis Date    ALLERGIC RHINITIS     Bronchial asthma     Hyperlipemia     Hyperlipidemia     Hypertension     not on medication    Obesity      Past Surgical History:   Procedure Laterality Date    COLONOSCOPY N/A 2/24/2023    Procedure: COLONOSCOPY GOLYTELY;  Surgeon: Jose Manuel Andres MD;  Location: [facility];  Service: Endoscopy;  Laterality: N/A;    Repair ruptured left biceps  2004     Family History   Problem Relation Name Age of Onset    Coronary artery disease Father      Heart disease Father      Diabetes Mother      Heart disease Mother      Hypertension Mother      Diabetes Sister      Hypertension Brother       Social History     Tobacco Use    Smoking status: Never    Smokeless tobacco: Never   Substance Use Topics    Alcohol use: Yes     Alcohol/week: 2.5 standard drinks of alcohol     Types: 3 Standard drinks or equivalent per week      Comment: 2x/week    Drug use: No     Review of Systems    Physical Exam     Initial Vitals [07/01/24 1150]   BP Pulse Resp Temp SpO2   (!) 144/84 80 20 98.5 °F (36.9 °C) 96 %      MAP       --         Physical Exam    Constitutional: He appears well-developed and well-nourished. He is not diaphoretic. No distress.   HENT:   Head: Normocephalic and atraumatic.   Eyes: EOM are normal.   Neck: Neck supple.   Normal range of motion.  Cardiovascular:  Normal rate.           Pulmonary/Chest: No respiratory distress. He has wheezes (diffuse inspiratory and expiratory).   Abdominal: He exhibits no distension.   Musculoskeletal:         General: Normal range of motion.      Cervical back: Normal range of motion and neck supple.      Comments: Bilateral LE with nonblanching nontender rash over distal extremities, similar to photographs in media tab from 6/29/24. No crepitus. Negative Nikolsky sign.      Neurological: He is alert and oriented to person, place, and time.   Skin: Skin is warm and dry.   Psychiatric: He has a normal mood and affect.         ED Course   Procedures  Labs Reviewed   CBC W/ AUTO DIFFERENTIAL - Abnormal; Notable for the following components:       Result Value    WBC 12.85 (*)     RBC 4.57 (*)     MCH 32.8 (*)     Gran # (ANC) 10.7 (*)     Immature Grans (Abs) 0.05 (*)     Gran % 82.9 (*)     Lymph % 8.1 (*)     All other components within normal limits   COMPREHENSIVE METABOLIC PANEL - Abnormal; Notable for the following components:    Glucose 116 (*)     All other components within normal limits   TROPONIN I   MAGNESIUM     EKG Readings: (Independently Interpreted)   Initial Reading: No STEMI. Previous EKG: Compared with most recent EKG Rhythm: Normal Sinus Rhythm. Heart Rate: 72. Ectopy: PVCs. Conduction: Normal. Axis: Normal. Clinical Impression: Normal Sinus Rhythm       Imaging Results              X-Ray Chest 1 View (Final result)  Result time 07/01/24 12:59:08      Final result by Joni  William MCFARLAND III, MD (07/01/24 12:59:08)                   Impression:      No acute process seen.      Electronically signed by: William Quinones MD  Date:    07/01/2024  Time:    12:59               Narrative:    EXAMINATION:  XR CHEST 1 VIEW    CLINICAL HISTORY:  Shortness of breath    FINDINGS:  Chest one view:    Heart size is normal.  Lungs are clear.  The bones showed DJD.                                       Medications   albuterol sulfate nebulizer solution 10 mg (10 mg Nebulization Given 7/1/24 1254)   ipratropium 0.02 % nebulizer solution 0.5 mg (0.5 mg Nebulization Given 7/1/24 1254)   predniSONE tablet 50 mg (50 mg Oral Given 7/1/24 1351)     Medical Decision Making  63-year-old male with past medical history as above presents with complaint of increased shortness of breath on exertion compared with his baseline.  Upon arrival patient was afebrile, stable vital signs.  Differential includes but isn't limited to pneumonia, anemia, CHF, asthma.  Chest x-ray without acute pathology including on my independent review.  No acute lab abnormalities.  EKG without acute ischemic changes.  Patient improved after DuoNebs.  Prescribed steroids, albuterol refill.  Also advised to follow up outpatient with Dermatology for his leg rash in addition to his primary care physician for today's ED visit.  Given strict return precautions and outpatient follow up.     No acute emergent medical condition has been identified. The patient appears to be low risk for an emergent medical condition is appropriate for discharge with outpatient f/u as detailed in discharge instructions for reevaluation and possible continued outpatient workup and management. I have discussed the workup with the patient, who has verbalized understanding of the plan and need for outpatient follow-up.  This evaluation does not preclude the development of an emergent condition so in addition, I have advised the patient that they can return to the ED at any  time with worsening or change of their symptoms, or with any other medical complaint.       Amount and/or Complexity of Data Reviewed  External Data Reviewed: notes.     Details: Seen 6/29/24 in the ED for bilateral lower extremity swelling   Labs: ordered. Decision-making details documented in ED Course.  Radiology: ordered and independent interpretation performed.  ECG/medicine tests: ordered and independent interpretation performed.    Risk  OTC drugs.  Prescription drug management.  Decision regarding hospitalization.               ED Course as of 07/01/24 1405   Mon Jul 01, 2024   1224 6/29/24 bilateral LE US Impression:  No evidence of deep venous thrombosis in either lower extremity.    [AT]   1313 Hemoglobin: 15.0  Normal  [AT]   1313 CXR Impression:   No acute process seen.   [AT]   1332 Creatinine: 1.1  Normal  [AT]   1351 Troponin I: <0.006  Normal  [AT]   1351 Creatinine: 1.1  Normal  [AT]   1359 On reassessment patient's wheezing is almost entirely resolved and he feels improved, ready for discharge. [AT]      ED Course User Index  [AT] Gracia Starks MD                           Clinical Impression:  Final diagnoses:  [J45.901] Exacerbation of asthma, unspecified asthma severity, unspecified whether persistent (Primary)  [R21] Rash          ED Disposition Condition    Discharge Stable          ED Prescriptions       Medication Sig Dispense Start Date End Date Auth. Provider    predniSONE (DELTASONE) 50 MG Tab Take 1 tablet (50 mg total) by mouth once daily. for 4 doses 4 tablet 7/2/2024 7/6/2024 Gracia Starks MD    albuterol (PROVENTIL/VENTOLIN HFA) 90 mcg/actuation inhaler Inhale 1-2 puffs into the lungs every 6 (six) hours as needed for Wheezing. Rescue 18 g 7/1/2024 7/1/2025 Gracia Starks MD          Follow-up Information       Follow up With Specialties Details Why Contact Info    Ernesto Mooney MD Family Medicine Schedule an appointment as soon as possible for a visit in 2  days  [address]  [address]  [facility] [address]  [phone]      [facility] DERMATOLOGY Dermatology Schedule an appointment as soon as possible for a visit in 2 days  [address] [facility] [address]  [phone]    [facility] - Emergency Dept Emergency Medicine  As needed, If symptoms worsen [address] [facility] [address]  [phone]             Gracia Starks MD  07/01/24 1402

## 2024-07-01 NOTE — TELEPHONE ENCOUNTER
Pt calling in, having leg pain and swelling since Thursday. Rash around ankles. Worsening SOB. Went to ED Saturday for this but did not advise about SOB. Rates pain 8/10. Swelling from toes to ankle. Dispo is ED now. Pt verbalizes understanding. Advised to call back with further concerns.      Reason for Disposition   Difficulty breathing at rest    Additional Information   Negative: SEVERE difficulty breathing (e.g., struggling for each breath, speaks in single words)   Negative: Looks like a broken bone or dislocated joint (e.g., crooked or deformed)   Negative: Sounds like a life-threatening emergency to the triager    Protocols used: Leg Swelling and Edema-A-AH

## 2024-07-01 NOTE — DISCHARGE INSTRUCTIONS

## 2024-07-02 LAB
OHS QRS DURATION: 114 MS
OHS QTC CALCULATION: 433 MS

## 2024-07-15 ENCOUNTER — TELEPHONE (OUTPATIENT)
Dept: INTERNAL MEDICINE | Facility: CLINIC | Age: 64
End: 2024-07-15
Payer: COMMERCIAL

## 2024-07-15 NOTE — TELEPHONE ENCOUNTER
"----- Message from Alysia Kentrick sent at 7/15/2024  2:41 PM CDT -----  Contact: Maxx [phone]  type: Lab    Caller is requesting to schedule their Lab appointment prior to an appointment.    Order is not listed in EPIC.  Please enter order and contact patient to schedule.    Preferred Date and Time of Labs:7 am - 7/18    Date of Appointment:check up is 7/22    Where would they like the lab performed?[facility]    Would the patient rather a call back or a response via My [facility]? Call    Best Call Back Number:[phone]    Additional Information:    "Do not send messages requesting lab orders prior to Physical appt on these providers: Gillian Estrada, Ari Hines,  Burt Miguel and Jp."  "

## 2024-07-15 NOTE — TELEPHONE ENCOUNTER
Left voice message   New patient to dr Srivastava   Will need t be seen in clinic before labs are ordered

## 2024-07-22 ENCOUNTER — OFFICE VISIT (OUTPATIENT)
Dept: INTERNAL MEDICINE | Facility: CLINIC | Age: 64
End: 2024-07-22
Payer: COMMERCIAL

## 2024-07-22 VITALS — DIASTOLIC BLOOD PRESSURE: 80 MMHG | SYSTOLIC BLOOD PRESSURE: 138 MMHG

## 2024-07-22 DIAGNOSIS — E78.5 HYPERLIPIDEMIA, UNSPECIFIED HYPERLIPIDEMIA TYPE: ICD-10-CM

## 2024-07-22 DIAGNOSIS — Z23 ENCOUNTER FOR ADMINISTRATION OF VACCINE: ICD-10-CM

## 2024-07-22 DIAGNOSIS — R73.03 PREDIABETES: Primary | ICD-10-CM

## 2024-07-22 DIAGNOSIS — I10 ESSENTIAL HYPERTENSION: ICD-10-CM

## 2024-07-22 DIAGNOSIS — J45.990 EXERCISE-INDUCED ASTHMA: ICD-10-CM

## 2024-07-22 PROCEDURE — 3075F SYST BP GE 130 - 139MM HG: CPT | Mod: CPTII,S$GLB,,

## 2024-07-22 PROCEDURE — 99999 PR PBB SHADOW E&M-EST. PATIENT-LVL II: CPT | Mod: PBBFAC,,,

## 2024-07-22 PROCEDURE — 99396 PREV VISIT EST AGE 40-64: CPT | Mod: S$GLB,,,

## 2024-07-22 PROCEDURE — 3079F DIAST BP 80-89 MM HG: CPT | Mod: CPTII,S$GLB,,

## 2024-07-22 RX ORDER — AMLODIPINE BESYLATE 5 MG/1
5 TABLET ORAL DAILY
Qty: 90 TABLET | Refills: 3 | Status: SHIPPED | OUTPATIENT
Start: 2024-07-22

## 2024-07-22 RX ORDER — ATORVASTATIN CALCIUM 40 MG/1
40 TABLET, FILM COATED ORAL DAILY
Qty: 90 TABLET | Refills: 3 | Status: SHIPPED | OUTPATIENT
Start: 2024-07-22 | End: 2025-07-17

## 2024-07-22 NOTE — PROGRESS NOTES
"  Clinic Note  7/22/2024      Subjective:       Patient ID:  Maxx is a 63 y.o. male being seen for an established visit.    Chief Complaint: Establish Care    63-year-old male with medical history of prediabetes, hyperlipidemia and hypertension who presents to resident clinic for care establishment.  The following were discussed today:     Hypertension: BP today 138/80   -- stable on home amlodipine 5 mg    Daytime fatigue: Ongoing. He has not interested in sleep study at this time. He sleeps alone and is unsure if he snores or has apneic events.    Charted history of exercise-induced asthma?: No pulmonary function tests on file. He has home medication of albuterol and was previously prescribed fluticasone. He does not use fluticasone anymore and occasionally uses the albuterol when he has phlegm".    -- monitoring symptoms and will escalate care as needed.    Contact dermatitis: Previously charted history of poison ivy exposure and apparent recent recurrence to bilateral shins which improved after brief prednisone course    Hyperlipidemia: On atorvastatin 40 mg    Prediabetes: most recent A1c of 5.8    Screenings and lifestyle: Up-to-date on colonoscopy and tetanus shot. Pneumococcal administered today. Not interested in other vaccines at this time.    Review of Systems   Constitutional:  Negative for fever.   HENT:  Negative for sore throat.    Eyes:  Negative for pain.   Respiratory:  Negative for shortness of breath.    Cardiovascular:  Negative for chest pain.   Gastrointestinal:  Negative for abdominal pain.   Genitourinary:  Negative for dysuria.   Musculoskeletal:  Negative for myalgias.   Skin:  Negative for rash.   Neurological:  Negative for headaches.   Psychiatric/Behavioral:  The patient is not nervous/anxious.        Past Medical History:   Diagnosis Date    ALLERGIC RHINITIS     Bronchial asthma     Hyperlipemia     Hyperlipidemia     Hypertension     not on medication    Obesity        Family " History   Problem Relation Name Age of Onset    Coronary artery disease Father      Heart disease Father      Diabetes Mother      Heart disease Mother      Hypertension Mother      Diabetes Sister      Hypertension Brother          reports that he has never smoked. He has never used smokeless tobacco. He reports current alcohol use of about 2.5 standard drinks of alcohol per week. He reports that he does not use drugs.    Medication List with Changes/Refills   New Medications    PNEUMOC 20-MAR CONJ-DIP CR,PF, (PREVNAR 20, PF,) 0.5 ML SYRG INJECTION    Inject into the muscle.    PNEUMOCOCCAL 23-MAR PS (PNEUMOVAX-23) 25 MCG/0.5 ML VACCINE    Inject 0.5 mLs into the muscle once. for 1 dose   Current Medications    ALBUTEROL (PROVENTIL/VENTOLIN HFA) 90 MCG/ACTUATION INHALER    Inhale 1-2 puffs into the lungs every 6 (six) hours as needed for Wheezing. Rescue    MULTIVIT WITH MIN-FA-LYCOPENE 0.4-600 MG-MCG TAB    Take by mouth. 1 Tablet Oral Every day   Changed and/or Refilled Medications    Modified Medication Previous Medication    AMLODIPINE (NORVASC) 5 MG TABLET amLODIPine (NORVASC) 5 MG tablet       Take 1 tablet (5 mg total) by mouth once daily.    TAKE 1 TABLET BY MOUTH EVERY DAY    ATORVASTATIN (LIPITOR) 40 MG TABLET atorvastatin (LIPITOR) 40 MG tablet       Take 1 tablet (40 mg total) by mouth once daily.    Take 1 tablet (40 mg total) by mouth once daily.   Discontinued Medications    ACETAMINOPHEN (TYLENOL) 325 MG TABLET    Take 2 tablets (650 mg total) by mouth every 6 (six) hours as needed for Pain.    CLOBETASOL (CLOBEX) 0.05 % LOTION    Apply topically 2 (two) times daily.    FLUTICASONE (FLOVENT HFA) 220 MCG/ACTUATION INHALER    Inhale 1 puff into the lungs 2 (two) times daily. Controller    IBUPROFEN (ADVIL,MOTRIN) 400 MG TABLET    Take 1 tablet (400 mg total) by mouth every 6 (six) hours as needed (pain).    KRILL/OM-3/DHA/EPA/PHOSPHO/AST (MEGARED OMEGA-3 KRILL OIL ORAL)    Take by mouth.     "LEVALBUTEROL (XOPENEX HFA) 45 MCG/ACTUATION INHALER    Inhale 1 puff into the lungs every 6 (six) hours as needed for Wheezing.     Review of patient's allergies indicates:  No Known Allergies    Patient Active Problem List   Diagnosis    Exercise-induced asthma    ALLERGIC RHINITIS    Hypertension    Hyperlipidemia    Obesity    Laceration of forehead    Rib pain    Closed fracture of multiple ribs of left side with routine healing    Chest pain at rest    Excessive daytime sleepiness    Prediabetes    Subclinical hypothyroidism           Objective:      There were no vitals taken for this visit.  Estimated body mass index is 29.76 kg/m² as calculated from the following:    Height as of 6/29/24: 5' 7" (1.702 m).    Weight as of 7/1/24: 86.2 kg (190 lb).    Physical Exam  Constitutional:       Appearance: He is not ill-appearing.   HENT:      Head: Normocephalic and atraumatic.      Nose: No rhinorrhea.      Mouth/Throat:      Mouth: Mucous membranes are moist.   Eyes:      Pupils: Pupils are equal, round, and reactive to light.   Cardiovascular:      Rate and Rhythm: Normal rate.   Pulmonary:      Effort: Pulmonary effort is normal.      Breath sounds: Normal breath sounds.   Abdominal:      General: Abdomen is flat. There is no distension.      Palpations: Abdomen is soft.      Tenderness: There is no abdominal tenderness.   Musculoskeletal:         General: No swelling. Normal range of motion.      Cervical back: Normal range of motion.   Skin:     General: Skin is warm and dry.   Neurological:      Mental Status: He is alert and oriented to person, place, and time.   Psychiatric:         Mood and Affect: Mood normal.           Assessment and Plan:         Maxx was seen today for establish care.    Diagnoses and all orders for this visit:    Prediabetes  -     HEMOGLOBIN A1C; Future    Essential hypertension  -     amLODIPine (NORVASC) 5 MG tablet; Take 1 tablet (5 mg total) by mouth once " daily.    Hyperlipidemia, unspecified hyperlipidemia type  -     atorvastatin (LIPITOR) 40 MG tablet; Take 1 tablet (40 mg total) by mouth once daily.  -     LIPID PANEL; Future    Exercise-induced asthma    Encounter for administration of vaccine  -     Discontinue: (VFC) PPSV23 (Pneumovax 23) IM vaccine (>/= 3 yo)      Pneumococcal vaccine to be given by pharmacy.    Follow up in about 6 months (around 1/22/2025).          Lynnette Srivastava MD  Internal Medicine, PGY-3  [facility]    Discussed with Dr. Trammell

## 2024-07-23 ENCOUNTER — LAB VISIT (OUTPATIENT)
Dept: LAB | Facility: HOSPITAL | Age: 64
End: 2024-07-23
Payer: COMMERCIAL

## 2024-07-23 DIAGNOSIS — E78.5 HYPERLIPIDEMIA, UNSPECIFIED HYPERLIPIDEMIA TYPE: ICD-10-CM

## 2024-07-23 DIAGNOSIS — R73.03 PREDIABETES: ICD-10-CM

## 2024-07-23 LAB
CHOLEST SERPL-MCNC: 155 MG/DL (ref 120–199)
CHOLEST/HDLC SERPL: 3 {RATIO} (ref 2–5)
ESTIMATED AVG GLUCOSE: 120 MG/DL (ref 68–131)
HBA1C MFR BLD: 5.8 % (ref 4–5.6)
HDLC SERPL-MCNC: 52 MG/DL (ref 40–75)
HDLC SERPL: 33.5 % (ref 20–50)
LDLC SERPL CALC-MCNC: 87.6 MG/DL (ref 63–159)
NONHDLC SERPL-MCNC: 103 MG/DL
TRIGL SERPL-MCNC: 77 MG/DL (ref 30–150)

## 2024-07-23 PROCEDURE — 36415 COLL VENOUS BLD VENIPUNCTURE: CPT

## 2024-07-23 PROCEDURE — 80061 LIPID PANEL: CPT

## 2024-07-23 PROCEDURE — 83036 HEMOGLOBIN GLYCOSYLATED A1C: CPT

## 2024-08-05 ENCOUNTER — TELEPHONE (OUTPATIENT)
Dept: INTERNAL MEDICINE | Facility: CLINIC | Age: 64
End: 2024-08-05
Payer: COMMERCIAL

## 2025-02-25 ENCOUNTER — TELEPHONE (OUTPATIENT)
Dept: PULMONOLOGY | Facility: CLINIC | Age: 65
End: 2025-02-25
Payer: COMMERCIAL

## 2025-02-25 ENCOUNTER — LAB VISIT (OUTPATIENT)
Dept: LAB | Facility: HOSPITAL | Age: 65
End: 2025-02-25
Payer: COMMERCIAL

## 2025-02-25 ENCOUNTER — OFFICE VISIT (OUTPATIENT)
Dept: INTERNAL MEDICINE | Facility: CLINIC | Age: 65
End: 2025-02-25
Payer: COMMERCIAL

## 2025-02-25 VITALS
WEIGHT: 198.19 LBS | BODY MASS INDEX: 31.11 KG/M2 | SYSTOLIC BLOOD PRESSURE: 130 MMHG | HEIGHT: 67 IN | DIASTOLIC BLOOD PRESSURE: 78 MMHG

## 2025-02-25 DIAGNOSIS — J45.909 ASTHMA, UNSPECIFIED ASTHMA SEVERITY, UNSPECIFIED WHETHER COMPLICATED, UNSPECIFIED WHETHER PERSISTENT: Primary | ICD-10-CM

## 2025-02-25 DIAGNOSIS — J45.990 EXERCISE-INDUCED ASTHMA: Primary | ICD-10-CM

## 2025-02-25 DIAGNOSIS — I10 ESSENTIAL HYPERTENSION: ICD-10-CM

## 2025-02-25 DIAGNOSIS — Z12.5 ENCOUNTER FOR SCREENING PROSTATE SPECIFIC ANTIGEN (PSA) MEASUREMENT: ICD-10-CM

## 2025-02-25 DIAGNOSIS — R73.03 PREDIABETES: ICD-10-CM

## 2025-02-25 DIAGNOSIS — E78.5 HYPERLIPIDEMIA, UNSPECIFIED HYPERLIPIDEMIA TYPE: ICD-10-CM

## 2025-02-25 LAB
ALBUMIN SERPL BCP-MCNC: 3.7 G/DL (ref 3.5–5.2)
ALBUMIN/CREAT UR: NORMAL UG/MG (ref 0–30)
ALP SERPL-CCNC: 100 U/L (ref 40–150)
ALT SERPL W/O P-5'-P-CCNC: 38 U/L (ref 10–44)
ANION GAP SERPL CALC-SCNC: 10 MMOL/L (ref 8–16)
AST SERPL-CCNC: 54 U/L (ref 10–40)
BILIRUB SERPL-MCNC: 0.7 MG/DL (ref 0.1–1)
BUN SERPL-MCNC: 11 MG/DL (ref 8–23)
CALCIUM SERPL-MCNC: 9.8 MG/DL (ref 8.7–10.5)
CHLORIDE SERPL-SCNC: 106 MMOL/L (ref 95–110)
CHOLEST SERPL-MCNC: 158 MG/DL (ref 120–199)
CHOLEST/HDLC SERPL: 3.2 {RATIO} (ref 2–5)
CO2 SERPL-SCNC: 25 MMOL/L (ref 23–29)
CREAT SERPL-MCNC: 1 MG/DL (ref 0.5–1.4)
CREAT UR-MCNC: 47 MG/DL (ref 23–375)
CREAT UR-MCNC: 47 MG/DL (ref 23–375)
EST. GFR  (NO RACE VARIABLE): >60 ML/MIN/1.73 M^2
ESTIMATED AVG GLUCOSE: 120 MG/DL (ref 68–131)
GLUCOSE SERPL-MCNC: 63 MG/DL (ref 70–110)
HBA1C MFR BLD: 5.8 % (ref 4–5.6)
HDLC SERPL-MCNC: 50 MG/DL (ref 40–75)
HDLC SERPL: 31.6 % (ref 20–50)
LDLC SERPL CALC-MCNC: 95.2 MG/DL (ref 63–159)
MICROALBUMIN UR DL<=1MG/L-MCNC: <5 UG/ML
NONHDLC SERPL-MCNC: 108 MG/DL
POTASSIUM SERPL-SCNC: 4.8 MMOL/L (ref 3.5–5.1)
PROT SERPL-MCNC: 7.1 G/DL (ref 6–8.4)
PROT UR-MCNC: <7 MG/DL (ref 0–15)
PROT/CREAT UR: NORMAL MG/G{CREAT} (ref 0–0.2)
SODIUM SERPL-SCNC: 141 MMOL/L (ref 136–145)
TRIGL SERPL-MCNC: 64 MG/DL (ref 30–150)

## 2025-02-25 PROCEDURE — 80061 LIPID PANEL: CPT

## 2025-02-25 PROCEDURE — 80053 COMPREHEN METABOLIC PANEL: CPT

## 2025-02-25 PROCEDURE — 85025 COMPLETE CBC W/AUTO DIFF WBC: CPT

## 2025-02-25 PROCEDURE — 83036 HEMOGLOBIN GLYCOSYLATED A1C: CPT

## 2025-02-25 PROCEDURE — 84156 ASSAY OF PROTEIN URINE: CPT

## 2025-02-25 PROCEDURE — 99999 PR PBB SHADOW E&M-EST. PATIENT-LVL III: CPT | Mod: PBBFAC,,,

## 2025-02-25 PROCEDURE — 84153 ASSAY OF PSA TOTAL: CPT

## 2025-02-25 PROCEDURE — 82043 UR ALBUMIN QUANTITATIVE: CPT

## 2025-02-25 RX ORDER — LORATADINE 10 MG/1
1 TABLET ORAL DAILY
COMMUNITY
Start: 2025-02-06 | End: 2026-02-06

## 2025-02-25 RX ORDER — ATORVASTATIN CALCIUM 40 MG/1
40 TABLET, FILM COATED ORAL DAILY
Qty: 90 TABLET | Refills: 3 | Status: SHIPPED | OUTPATIENT
Start: 2025-02-25 | End: 2026-02-20

## 2025-02-25 RX ORDER — AMLODIPINE BESYLATE 5 MG/1
5 TABLET ORAL DAILY
Qty: 90 TABLET | Refills: 3 | Status: SHIPPED | OUTPATIENT
Start: 2025-02-25

## 2025-02-25 NOTE — PROGRESS NOTES
"  Clinic Note  2/25/2025      Subjective:       Patient ID:  Maxx is a 64 y.o. male being seen for an established visit.    Chief Complaint: Annual Exam    64-year-old male with medical history of prediabetes, hyperlipidemia and hypertension who presents for annual examination. The following were discussed today:     Hypertension: BP today 130/78   -- stable on home amlodipine 5 mg   -- F/U protein:cr and consider ARB if appropriate    Daytime fatigue: Ongoing. He has not interested in sleep study at this time. He sleeps alone and is unsure if he snores or has apneic events.   -- Again declines sleep medicine referral    Charted history of exercise-induced asthma?: No pulmonary function tests on file. He has home medication of albuterol and was previously prescribed fluticasone. He does not use fluticasone anymore and occasionally uses the albuterol when he has phlegm".    -- Still complains of phlegm and recently had URI with montelukast Rx'ed?   -- Referral to pulmonary medicine for asthma evaluation - will likely need ICS containing rescue    Contact dermatitis: Previously charted history of poison ivy exposure and apparent recent recurrence to bilateral shins which improved after brief prednisone course    Hyperlipidemia: On atorvastatin 40 mg    Prediabetes: most recent A1c of 5.8   -- F/U repeat    Nocturia: Possibly BPH vs undiagnosed JENNIFER? Will check A1C as well. May benefit from flomax     Screenings and lifestyle: Up-to-date on colonoscopy (repeat in 2033) and tetanus shot. Pneumococcal administered previously. Not interested in other vaccines at this time. Will obtain PSA    Review of Systems   Constitutional:  Negative for fever.   HENT:  Negative for sore throat.    Eyes:  Negative for pain.   Respiratory:  Negative for shortness of breath.    Cardiovascular:  Negative for chest pain.   Gastrointestinal:  Negative for abdominal pain.   Genitourinary:  Negative for dysuria.   Musculoskeletal:  Negative " for myalgias.   Skin:  Negative for rash.   Neurological:  Negative for headaches.   Psychiatric/Behavioral:  The patient is not nervous/anxious.        Past Medical History:   Diagnosis Date    ALLERGIC RHINITIS     Bronchial asthma     Hyperlipemia     Hyperlipidemia     Hypertension     not on medication    Obesity        Family History   Problem Relation Name Age of Onset    Coronary artery disease Father      Heart disease Father      Diabetes Mother      Heart disease Mother      Hypertension Mother      Diabetes Sister      Hypertension Brother          reports that he has never smoked. He has never used smokeless tobacco. He reports current alcohol use of about 2.5 standard drinks of alcohol per week. He reports that he does not use drugs.    Medication List with Changes/Refills   Current Medications    ALBUTEROL (PROVENTIL/VENTOLIN HFA) 90 MCG/ACTUATION INHALER    Inhale 1-2 puffs into the lungs every 6 (six) hours as needed for Wheezing. Rescue    AMLODIPINE (NORVASC) 5 MG TABLET    Take 1 tablet (5 mg total) by mouth once daily.    ATORVASTATIN (LIPITOR) 40 MG TABLET    Take 1 tablet (40 mg total) by mouth once daily.    LORATADINE (CLARITIN) 10 MG TABLET    Take 1 tablet by mouth once daily.    MULTIVIT WITH MIN-FA-LYCOPENE 0.4-600 MG-MCG TAB    Take by mouth. 1 Tablet Oral Every day    PNEUMOC 20-MAR CONJ-DIP CR,PF, (PREVNAR 20, PF,) 0.5 ML SYRG INJECTION    Inject into the muscle.     Review of patient's allergies indicates:  No Known Allergies    Patient Active Problem List   Diagnosis    Exercise-induced asthma    ALLERGIC RHINITIS    Hypertension    Hyperlipidemia    Obesity    Laceration of forehead    Rib pain    Closed fracture of multiple ribs of left side with routine healing    Chest pain at rest    Excessive daytime sleepiness    Prediabetes    Subclinical hypothyroidism           Objective:      There were no vitals taken for this visit.  Estimated body mass index is 29.76 kg/m² as  "calculated from the following:    Height as of 6/29/24: 5' 7" (1.702 m).    Weight as of 7/1/24: 86.2 kg (190 lb).    Physical Exam  Constitutional:       Appearance: He is not ill-appearing.   HENT:      Head: Normocephalic and atraumatic.      Nose: No rhinorrhea.      Mouth/Throat:      Mouth: Mucous membranes are moist.   Eyes:      Pupils: Pupils are equal, round, and reactive to light.   Cardiovascular:      Rate and Rhythm: Normal rate.   Pulmonary:      Effort: Pulmonary effort is normal.      Breath sounds: Normal breath sounds.   Abdominal:      General: Abdomen is flat. There is no distension.      Palpations: Abdomen is soft.      Tenderness: There is no abdominal tenderness.   Musculoskeletal:         General: No swelling. Normal range of motion.      Cervical back: Normal range of motion.   Skin:     General: Skin is warm and dry.   Neurological:      Mental Status: He is alert and oriented to person, place, and time.   Psychiatric:         Mood and Affect: Mood normal.           Assessment and Plan:         Maxx was seen today for annual exam.    Diagnoses and all orders for this visit:    Exercise-induced asthma  -     Ambulatory referral/consult to Pulmonology; Future    Prediabetes  -     LIPID PANEL; Future  -     Hemoglobin A1C; Future  -     Comprehensive Metabolic Panel; Future  -     CBC Auto Differential; Standing  -     Protein/Creatinine Ratio, Urine  -     Microalbumin/creatinine urine ratio    Hyperlipidemia, unspecified hyperlipidemia type  -     LIPID PANEL; Future  -     atorvastatin (LIPITOR) 40 MG tablet; Take 1 tablet (40 mg total) by mouth once daily.    Encounter for screening prostate specific antigen (PSA) measurement  -     PSA, Screening; Future    Essential hypertension  -     amLODIPine (NORVASC) 5 MG tablet; Take 1 tablet (5 mg total) by mouth once daily.        Pneumococcal vaccine to be given by pharmacy.    Follow up in about 6 months (around 8/25/2025) for transfer of " care (new PCP).          Lynnette Srivastava MD  Internal Medicine, PGY-3  [facility]    Discussed with Dr. Douglas

## 2025-02-26 ENCOUNTER — TELEPHONE (OUTPATIENT)
Dept: INTERNAL MEDICINE | Facility: CLINIC | Age: 65
End: 2025-02-26
Payer: COMMERCIAL

## 2025-02-26 ENCOUNTER — RESULTS FOLLOW-UP (OUTPATIENT)
Dept: INTERNAL MEDICINE | Facility: CLINIC | Age: 65
End: 2025-02-26

## 2025-02-26 LAB
BASOPHILS # BLD AUTO: 0.08 K/UL (ref 0–0.2)
BASOPHILS NFR BLD: 0.9 % (ref 0–1.9)
COMPLEXED PSA SERPL-MCNC: 0.55 NG/ML (ref 0–4)
DIFFERENTIAL METHOD BLD: ABNORMAL
EOSINOPHIL # BLD AUTO: 0.2 K/UL (ref 0–0.5)
EOSINOPHIL NFR BLD: 1.8 % (ref 0–8)
ERYTHROCYTE [DISTWIDTH] IN BLOOD BY AUTOMATED COUNT: 13.3 % (ref 11.5–14.5)
HCT VFR BLD AUTO: 45.1 % (ref 40–54)
HGB BLD-MCNC: 14.9 G/DL (ref 14–18)
IMM GRANULOCYTES # BLD AUTO: 0.02 K/UL (ref 0–0.04)
IMM GRANULOCYTES NFR BLD AUTO: 0.2 % (ref 0–0.5)
LYMPHOCYTES # BLD AUTO: 1.6 K/UL (ref 1–4.8)
LYMPHOCYTES NFR BLD: 17.7 % (ref 18–48)
MCH RBC QN AUTO: 33 PG (ref 27–31)
MCHC RBC AUTO-ENTMCNC: 33 G/DL (ref 32–36)
MCV RBC AUTO: 100 FL (ref 82–98)
MONOCYTES # BLD AUTO: 0.7 K/UL (ref 0.3–1)
MONOCYTES NFR BLD: 8.4 % (ref 4–15)
NEUTROPHILS # BLD AUTO: 6.2 K/UL (ref 1.8–7.7)
NEUTROPHILS NFR BLD: 71 % (ref 38–73)
NRBC BLD-RTO: 0 /100 WBC
PLATELET # BLD AUTO: 214 K/UL (ref 150–450)
PMV BLD AUTO: 12.2 FL (ref 9.2–12.9)
RBC # BLD AUTO: 4.52 M/UL (ref 4.6–6.2)
WBC # BLD AUTO: 8.74 K/UL (ref 3.9–12.7)

## 2025-03-03 ENCOUNTER — TELEPHONE (OUTPATIENT)
Dept: PULMONOLOGY | Facility: CLINIC | Age: 65
End: 2025-03-03
Payer: COMMERCIAL

## 2025-03-03 NOTE — TELEPHONE ENCOUNTER
Attempted to contact patient regarding scheduled appointment 03/27/25@4:00pm with Dr Good. Provider will be unavailable at appointment date and time therefore, appointment need to be rescheduled. Message left for the patient to call the office.

## 2025-03-11 ENCOUNTER — TELEPHONE (OUTPATIENT)
Dept: PULMONOLOGY | Facility: CLINIC | Age: 65
End: 2025-03-11
Payer: COMMERCIAL

## 2025-03-11 NOTE — TELEPHONE ENCOUNTER
Spoke to patient appointment rescheduled 03/25/25@8:00am with Dr Latisha fenton. Patient verbalized understanding .

## 2025-03-18 ENCOUNTER — TELEPHONE (OUTPATIENT)
Dept: PULMONOLOGY | Facility: CLINIC | Age: 65
End: 2025-03-18
Payer: COMMERCIAL

## 2025-03-18 NOTE — TELEPHONE ENCOUNTER
Attempted to contact patient to confirm scheduled appointment 03/25/25@8:00am with Dr Good. Message left for patient to call the office to confirm.

## 2025-03-25 ENCOUNTER — HOSPITAL ENCOUNTER (OUTPATIENT)
Dept: PULMONOLOGY | Facility: CLINIC | Age: 65
Discharge: HOME | End: 2025-03-25
Payer: COMMERCIAL

## 2025-03-25 ENCOUNTER — OFFICE VISIT (OUTPATIENT)
Dept: PULMONOLOGY | Facility: CLINIC | Age: 65
End: 2025-03-25
Payer: COMMERCIAL

## 2025-03-25 ENCOUNTER — LAB VISIT (OUTPATIENT)
Dept: LAB | Facility: HOSPITAL | Age: 65
End: 2025-03-25
Payer: COMMERCIAL

## 2025-03-25 VITALS
BODY MASS INDEX: 31.08 KG/M2 | OXYGEN SATURATION: 97 % | RESPIRATION RATE: 18 BRPM | WEIGHT: 198 LBS | HEIGHT: 67 IN | DIASTOLIC BLOOD PRESSURE: 88 MMHG | HEART RATE: 73 BPM | SYSTOLIC BLOOD PRESSURE: 128 MMHG

## 2025-03-25 DIAGNOSIS — J45.909 ASTHMA, UNSPECIFIED ASTHMA SEVERITY, UNSPECIFIED WHETHER COMPLICATED, UNSPECIFIED WHETHER PERSISTENT: ICD-10-CM

## 2025-03-25 DIAGNOSIS — J30.9 ALLERGIC RHINITIS, UNSPECIFIED SEASONALITY, UNSPECIFIED TRIGGER: ICD-10-CM

## 2025-03-25 DIAGNOSIS — J45.40 MODERATE PERSISTENT ASTHMA WITHOUT COMPLICATION: Primary | ICD-10-CM

## 2025-03-25 DIAGNOSIS — Z23 NEED FOR PNEUMOCOCCAL 20-VALENT CONJUGATE VACCINATION: ICD-10-CM

## 2025-03-25 DIAGNOSIS — Z23 NEED FOR INFLUENZA VACCINATION: ICD-10-CM

## 2025-03-25 DIAGNOSIS — R05.9 COUGH, UNSPECIFIED TYPE: ICD-10-CM

## 2025-03-25 DIAGNOSIS — R73.03 PREDIABETES: ICD-10-CM

## 2025-03-25 DIAGNOSIS — J45.40 MODERATE PERSISTENT ASTHMA WITHOUT COMPLICATION: ICD-10-CM

## 2025-03-25 DIAGNOSIS — R06.00 DYSPNEA, UNSPECIFIED TYPE: ICD-10-CM

## 2025-03-25 DIAGNOSIS — J45.990 EXERCISE-INDUCED ASTHMA: ICD-10-CM

## 2025-03-25 LAB
ABSOLUTE EOSINOPHIL (OHS): 0.31 K/UL
ABSOLUTE MONOCYTE (OHS): 0.9 K/UL (ref 0.3–1)
ABSOLUTE NEUTROPHIL COUNT (OHS): 5.38 K/UL (ref 1.8–7.7)
BASOPHILS # BLD AUTO: 0.09 K/UL
BASOPHILS NFR BLD AUTO: 1.1 %
DLCO ADJ PRE: 19.81 ML/(MIN*MMHG) (ref 19.11–32.96)
DLCO SINGLE BREATH LLN: 19.11
DLCO SINGLE BREATH PRE REF: 76.7 %
DLCO SINGLE BREATH REF: 26.03
DLCOC SBVA LLN: 2.72
DLCOC SBVA PRE REF: 107.6 %
DLCOC SBVA REF: 3.99
DLCOC SINGLE BREATH LLN: 19.11
DLCOC SINGLE BREATH PRE REF: 76.1 %
DLCOC SINGLE BREATH REF: 26.03
DLCOCSBVAULN: 5.27
DLCOCSINGLEBREATHULN: 32.96
DLCOCSINGLEBREATHZSCORE: -1.48
DLCOSINGLEBREATHULN: 32.96
DLCOSINGLEBREATHZSCORE: -1.44
DLCOVA LLN: 2.72
DLCOVA PRE REF: 108.5 %
DLCOVA PRE: 4.33 ML/(MIN*MMHG*L) (ref 2.72–5.27)
DLCOVA REF: 3.99
DLCOVAULN: 5.27
DLVAADJ PRE: 4.3 ML/(MIN*MMHG*L) (ref 2.72–5.27)
ERV LLN: -16448.93
ERV PRE REF: 50.9 %
ERV REF: 1.07
ERVULN: ABNORMAL
ERYTHROCYTE [DISTWIDTH] IN BLOOD BY AUTOMATED COUNT: 13.3 % (ref 11.5–14.5)
FEF 25 75 LLN: 1.58
FEF 25 75 PRE REF: 51.2 %
FEF 25 75 REF: 3.29
FEV05 LLN: 1.34
FEV05 REF: 2.48
FEV1 FVC LLN: 65
FEV1 FVC PRE REF: 93.2 %
FEV1 FVC REF: 77
FEV1 LLN: 2.33
FEV1 PRE REF: 77 %
FEV1 REF: 3.14
FEV1FVCZSCORE: -0.73
FEV1ZSCORE: -1.48
FRCPLETH LLN: 2.47
FRCPLETH PREREF: 62.6 %
FRCPLETH REF: 3.46
FRCPLETHULN: 4.45
FVC LLN: 3.06
FVC PRE REF: 82.6 %
FVC REF: 4.05
FVCZSCORE: -1.17
HCT VFR BLD AUTO: 45.1 % (ref 40–54)
HGB BLD-MCNC: 14.7 GM/DL (ref 14–18)
IGE SERPL-ACNC: <21 IU/ML
IMM GRANULOCYTES # BLD AUTO: 0.02 K/UL (ref 0–0.04)
IMM GRANULOCYTES NFR BLD AUTO: 0.3 % (ref 0–0.5)
IVC PRE: 3.43 L (ref 3.06–5.06)
IVC SINGLE BREATH LLN: 3.06
IVC SINGLE BREATH PRE REF: 84.5 %
IVC SINGLE BREATH REF: 4.05
IVCSINGLEBREATHULN: 5.06
LLN IC: -9999997.11
LYMPHOCYTES # BLD AUTO: 1.13 K/UL (ref 1–4.8)
MCH RBC QN AUTO: 31.8 PG (ref 27–50)
MCHC RBC AUTO-ENTMCNC: 32.6 G/DL (ref 32–36)
MCV RBC AUTO: 98 FL (ref 82–98)
NUCLEATED RBC (/100WBC) (OHS): 0 /100 WBC
PEF LLN: 6.21
PEF PRE REF: 86.1 %
PEF REF: 8.33
PHYSICIAN COMMENT: ABNORMAL
PLATELET # BLD AUTO: 196 K/UL (ref 150–450)
PMV BLD AUTO: 11.3 FL (ref 9.2–12.9)
PRE DLCO: 19.98 ML/(MIN*MMHG) (ref 19.11–32.96)
PRE ERV: 0.55 L (ref -16448.93–16451.07)
PRE FEF 25 75: 1.69 L/S (ref 1.58–5)
PRE FET 100: 9.04 SEC
PRE FEV05 REF: 73.9 %
PRE FEV1 FVC: 72.18 % (ref 64.93–88.47)
PRE FEV1: 2.42 L (ref 2.33–3.89)
PRE FEV5: 1.83 L (ref 1.34–3.61)
PRE FRC PL: 2.17 L (ref 2.47–4.45)
PRE FVC: 3.35 L (ref 3.06–5.06)
PRE IC: 2.9 L (ref -9999997.11–#######.####)
PRE PEF: 7.17 L/S (ref 6.21–10.45)
PRE REF IC: 100.1 %
PRE RV: 1.62 L (ref 1.71–3.06)
PRE TLC: 5.06 L (ref 5.37–7.67)
RAW PRE REF: 72.5 %
RAW PRE: 2.22 CMH2O*S/L (ref 3.06–3.06)
RAW REF: 3.06
RBC # BLD AUTO: 4.62 M/UL (ref 4.6–6.2)
REF IC: 2.89
RELATIVE EOSINOPHIL (OHS): 4 %
RELATIVE LYMPHOCYTE (OHS): 14.4 % (ref 18–48)
RELATIVE MONOCYTE (OHS): 11.5 % (ref 4–15)
RELATIVE NEUTROPHIL (OHS): 68.7 % (ref 38–73)
RV LLN: 1.71
RV PRE REF: 67.9 %
RV REF: 2.39
RVTLC LLN: 30
RVTLC PRE REF: 83 %
RVTLC PRE: 31.98 % (ref 29.55–47.51)
RVTLC REF: 39
RVTLCULN: 48
RVULN: 3.06
SGAW PRE REF: 197.8 %
SGAW PRE: 0.16 1/(CMH2O*S) (ref 0.08–0.08)
SGAW REF: 0.08
TLC LLN: 5.37
TLC PRE REF: 77.7 %
TLC REF: 6.52
TLC ULN: 7.67
TLCZSCORE: -2.08
ULN IC: ABNORMAL
VA PRE: 4.61 L (ref 6.37–6.37)
VA SINGLE BREATH LLN: 6.37
VA SINGLE BREATH PRE REF: 72.4 %
VA SINGLE BREATH REF: 6.37
VASINGLEBREATHULN: 6.37
VC LLN: 3.06
VC PRE REF: 84.9 %
VC PRE: 3.44 L (ref 3.06–5.06)
VC REF: 4.05
VC ULN: 5.06
WBC # BLD AUTO: 7.83 K/UL (ref 3.9–12.7)

## 2025-03-25 PROCEDURE — 3061F NEG MICROALBUMINURIA REV: CPT | Mod: CPTII,S$GLB,, | Performed by: INTERNAL MEDICINE

## 2025-03-25 PROCEDURE — 3066F NEPHROPATHY DOC TX: CPT | Mod: CPTII,S$GLB,, | Performed by: INTERNAL MEDICINE

## 2025-03-25 PROCEDURE — 85025 COMPLETE CBC W/AUTO DIFF WBC: CPT

## 2025-03-25 PROCEDURE — 82785 ASSAY OF IGE: CPT

## 2025-03-25 PROCEDURE — 3079F DIAST BP 80-89 MM HG: CPT | Mod: CPTII,S$GLB,, | Performed by: INTERNAL MEDICINE

## 2025-03-25 PROCEDURE — 3074F SYST BP LT 130 MM HG: CPT | Mod: CPTII,S$GLB,, | Performed by: INTERNAL MEDICINE

## 2025-03-25 PROCEDURE — 3044F HG A1C LEVEL LT 7.0%: CPT | Mod: CPTII,S$GLB,, | Performed by: INTERNAL MEDICINE

## 2025-03-25 PROCEDURE — 99204 OFFICE O/P NEW MOD 45 MIN: CPT | Mod: 25,S$GLB,, | Performed by: INTERNAL MEDICINE

## 2025-03-25 PROCEDURE — 99999 PR PBB SHADOW E&M-EST. PATIENT-LVL III: CPT | Mod: PBBFAC,,, | Performed by: INTERNAL MEDICINE

## 2025-03-25 PROCEDURE — 94729 DIFFUSING CAPACITY: CPT | Mod: S$GLB,,, | Performed by: INTERNAL MEDICINE

## 2025-03-25 PROCEDURE — 3008F BODY MASS INDEX DOCD: CPT | Mod: CPTII,S$GLB,, | Performed by: INTERNAL MEDICINE

## 2025-03-25 PROCEDURE — 94010 BREATHING CAPACITY TEST: CPT | Mod: S$GLB,,, | Performed by: INTERNAL MEDICINE

## 2025-03-25 PROCEDURE — 94726 PLETHYSMOGRAPHY LUNG VOLUMES: CPT | Mod: S$GLB,,, | Performed by: INTERNAL MEDICINE

## 2025-03-25 PROCEDURE — 36415 COLL VENOUS BLD VENIPUNCTURE: CPT

## 2025-03-25 RX ORDER — PREDNISONE 20 MG/1
TABLET ORAL
Qty: 7 TABLET | Refills: 0 | Status: SHIPPED | OUTPATIENT
Start: 2025-03-25 | End: 2025-03-31

## 2025-03-25 NOTE — PROGRESS NOTES
"History & Physical  [facility]    SUBJECTIVE:     Chief Complaint:   Asthma    History of Present Illness:  Maxx Lo is a 64 y.o. male who presents for evaluation of asthma. He was told he had asthma & bronchitis as a child. He started using inhalers when he was a teenager. He used to take allergy shots as a teenager & young adult.    Symptoms include shortness of breath & wheezing. He experiences dyspnea with exertion sometimes. "I get sinus congestion. When I start to get that drip, it irritates my lungs. As soon as I lay down, that's when I have problems. I start coughing. I feel heaviness in my chest." His symptoms almost always affect his sinuses & chest at the same time.    Last month, he was having a lot of symptoms & went to [facility]. He took a course of steroids & abx. He states that after five days he felt great. He continued to feel well for about four weeks but the symptoms are returning now.    He uses an albuterol as needed. He goes through 1 - 2 inhalers per year. He feels like the albuterol helps him a little. He has trouble clearing the mucus.    He has never been hospitalized for respiratory problems.    He is a lifetime nonsmoker.    He works delivering food to restaurants.    Review of patient's allergies indicates:  No Known Allergies    Past Medical History:   Diagnosis Date    ALLERGIC RHINITIS     Bronchial asthma     Hyperlipemia     Hyperlipidemia     Hypertension     not on medication    Obesity      Past Surgical History:   Procedure Laterality Date    COLONOSCOPY N/A 2/24/2023    Procedure: COLONOSCOPY GOLYTELY;  Surgeon: Jose Manuel Andres MD;  Location: [facility];  Service: Endoscopy;  Laterality: N/A;    Repair ruptured left biceps  2004     Family History   Problem Relation Name Age of Onset    Coronary artery disease Father      Heart disease Father      Diabetes Mother      Heart disease Mother      Hypertension Mother      Diabetes Sister      Hypertension " "Brother       Social History[1]  Review of Systems:  No pertinent positives.    OBJECTIVE:     Vital Signs  Vitals:    03/25/25 0802   BP: 128/88   BP Location: Right arm   Patient Position: Sitting   Pulse: 73   Resp: 18   SpO2: 97%   Weight: 89.8 kg (198 lb)   Height: 5' 7" (1.702 m)     Body mass index is 31.01 kg/m².    Physical Exam:  General: no distress  Eyes:  conjunctivae/corneas clear  Nose: no discharge  Neck: trachea midline with no masses appreciated  Lungs:  normal respiratory effort, +wheezes & rhonchi  Heart: regular rate and rhythm and no murmur  Abdomen: non-distended  Extremities: no cyanosis, no edema, no clubbing  Skin: No rashes or lesions. good skin turgor  Neurologic: alert, oriented, thought content appropriate    Laboratory:  Lab Results   Component Value Date    WBC 8.74 02/25/2025    HGB 14.9 02/25/2025    HCT 45.1 02/25/2025     (H) 02/25/2025     02/25/2025      Latest Reference Range & Units 04/12/06 07:40 02/28/07 09:22 01/07/09 11:40 05/14/10 08:36 07/30/10 08:06 06/18/12 07:03 03/15/18 08:48 09/23/20 10:45 08/02/21 13:26 12/06/22 08:44 06/29/24 19:47 07/01/24 12:56 02/25/25 14:30   Eos # 0.0 - 0.5 K/uL 0.2 0.2 0.2 0.2 0.2 0.2 0.2 0.2 0.1 0.3 0.2 0.1 0.2     Chest Imaging, My Impression:   CXR 7/2024: low lung volumes    Diagnostic Results:  PFT 3/2025: no obstruction, mild restriction, DLCO/VA is normal    ASSESSMENT/PLAN:     Dyspnea  Chronic cough  Wheezing  Restriction on pulmonary function test  Moderate persistent asthma with exacerbation    - Needs further evaluation with a chest CT. Specifically need to evaluate if he has bronchiectasis or not.  - Check IgE & eosinophils to assess degree of allergic inflammation.  - Consider ICS/LABA, flonase, nebulizer, ENT evaluation pending findings.  - Course of prednisone now.  - Call to discuss result.  - Need for RSV, covid, & flu vaccines. All are recommended, but it is best to defer until at least two weeks off " prednisone. Schedule a nurse visit for prevnar 20 & flu vaccine in one month please. Get RSV vaccine from pharmacy please.    Moe Good MD  [facility]           [1]   Social History  Socioeconomic History    Marital status:    Occupational History    Occupation: mel   Tobacco Use    Smoking status: Never    Smokeless tobacco: Never   Substance and Sexual Activity    Alcohol use: Yes     Alcohol/week: 2.5 standard drinks of alcohol     Types: 3 Standard drinks or equivalent per week     Comment: 2x/week    Drug use: No    Sexual activity: Yes     Partners: Female     Social Drivers of Health     Financial Resource Strain: Low Risk  (2/18/2025)    Overall Financial Resource Strain (CARDIA)     Difficulty of Paying Living Expenses: Not very hard   Food Insecurity: No Food Insecurity (2/18/2025)    Hunger Vital Sign     Worried About Running Out of Food in the Last Year: Never true     Ran Out of Food in the Last Year: Never true   Transportation Needs: No Transportation Needs (2/18/2025)    PRAPARE - Transportation     Lack of Transportation (Medical): No     Lack of Transportation (Non-Medical): No   Physical Activity: Insufficiently Active (2/18/2025)    Exercise Vital Sign     Days of Exercise per Week: 1 day     Minutes of Exercise per Session: 30 min   Stress: No Stress Concern Present (7/21/2024)    [employer] - Occupational Stress Questionnaire     Feeling of Stress : Only a little   Housing Stability: Low Risk  (2/18/2025)    Housing Stability Vital Sign     Unable to Pay for Housing in the Last Year: No     Number of Times Moved in the Last Year: 0     Homeless in the Last Year: No

## 2025-03-30 ENCOUNTER — HOSPITAL ENCOUNTER (OUTPATIENT)
Dept: RADIOLOGY | Facility: HOSPITAL | Age: 65
Discharge: HOME | End: 2025-03-30
Attending: INTERNAL MEDICINE
Payer: COMMERCIAL

## 2025-03-30 DIAGNOSIS — R05.9 COUGH, UNSPECIFIED TYPE: ICD-10-CM

## 2025-03-30 DIAGNOSIS — R06.00 DYSPNEA, UNSPECIFIED TYPE: ICD-10-CM

## 2025-03-30 PROCEDURE — 71250 CT THORAX DX C-: CPT | Mod: TC

## 2025-03-30 PROCEDURE — 71250 CT THORAX DX C-: CPT | Mod: 26,,, | Performed by: RADIOLOGY

## 2025-04-24 ENCOUNTER — CLINICAL SUPPORT (OUTPATIENT)
Dept: PULMONOLOGY | Facility: CLINIC | Age: 65
End: 2025-04-24
Payer: COMMERCIAL

## 2025-04-24 DIAGNOSIS — Z23 NEED FOR INFLUENZA VACCINATION: ICD-10-CM

## 2025-04-24 DIAGNOSIS — Z23 NEED FOR PNEUMOCOCCAL 20-VALENT CONJUGATE VACCINATION: Primary | ICD-10-CM

## 2025-04-24 NOTE — PROGRESS NOTES
Prevnar 20 and influenza vaccine administered intramuscularly--both in right deltoid.    Steve Burt, PharmD  Clinical Pharmacist - Allergy/Pulmonary

## 2025-08-06 DIAGNOSIS — R91.1 LUNG NODULE: Primary | ICD-10-CM

## 2025-08-11 ENCOUNTER — TELEPHONE (OUTPATIENT)
Dept: PULMONOLOGY | Facility: CLINIC | Age: 65
End: 2025-08-11
Payer: COMMERCIAL

## 2025-08-15 ENCOUNTER — OFFICE VISIT (OUTPATIENT)
Dept: FAMILY MEDICINE | Facility: CLINIC | Age: 65
End: 2025-08-15
Payer: COMMERCIAL

## 2025-08-15 ENCOUNTER — LAB VISIT (OUTPATIENT)
Dept: LAB | Facility: HOSPITAL | Age: 65
End: 2025-08-15
Attending: INTERNAL MEDICINE
Payer: COMMERCIAL

## 2025-08-15 VITALS
SYSTOLIC BLOOD PRESSURE: 130 MMHG | WEIGHT: 203.06 LBS | HEART RATE: 68 BPM | BODY MASS INDEX: 31.87 KG/M2 | DIASTOLIC BLOOD PRESSURE: 80 MMHG | HEIGHT: 67 IN | OXYGEN SATURATION: 98 %

## 2025-08-15 DIAGNOSIS — E78.49 OTHER HYPERLIPIDEMIA: ICD-10-CM

## 2025-08-15 DIAGNOSIS — R79.89 ELEVATED LFTS: ICD-10-CM

## 2025-08-15 DIAGNOSIS — Z00.00 PREVENTATIVE HEALTH CARE: ICD-10-CM

## 2025-08-15 DIAGNOSIS — R73.03 PREDIABETES: ICD-10-CM

## 2025-08-15 DIAGNOSIS — I10 PRIMARY HYPERTENSION: ICD-10-CM

## 2025-08-15 DIAGNOSIS — E03.8 SUBCLINICAL HYPOTHYROIDISM: ICD-10-CM

## 2025-08-15 DIAGNOSIS — E66.811 CLASS 1 OBESITY DUE TO EXCESS CALORIES WITH SERIOUS COMORBIDITY AND BODY MASS INDEX (BMI) OF 31.0 TO 31.9 IN ADULT: ICD-10-CM

## 2025-08-15 DIAGNOSIS — J30.89 SEASONAL ALLERGIC RHINITIS DUE TO OTHER ALLERGIC TRIGGER: ICD-10-CM

## 2025-08-15 DIAGNOSIS — E66.09 CLASS 1 OBESITY DUE TO EXCESS CALORIES WITH SERIOUS COMORBIDITY AND BODY MASS INDEX (BMI) OF 31.0 TO 31.9 IN ADULT: ICD-10-CM

## 2025-08-15 DIAGNOSIS — Z00.00 PREVENTATIVE HEALTH CARE: Primary | ICD-10-CM

## 2025-08-15 DIAGNOSIS — Z23 NEED FOR VACCINATION: ICD-10-CM

## 2025-08-15 PROBLEM — R07.81 RIB PAIN: Status: RESOLVED | Noted: 2018-10-22 | Resolved: 2025-08-15

## 2025-08-15 PROBLEM — S01.81XA LACERATION OF FOREHEAD: Status: RESOLVED | Noted: 2018-10-01 | Resolved: 2025-08-15

## 2025-08-15 PROBLEM — S22.42XD CLOSED FRACTURE OF MULTIPLE RIBS OF LEFT SIDE WITH ROUTINE HEALING: Status: RESOLVED | Noted: 2018-10-29 | Resolved: 2025-08-15

## 2025-08-15 LAB
ALBUMIN SERPL BCP-MCNC: 3.9 G/DL (ref 3.5–5.2)
ALP SERPL-CCNC: 129 UNIT/L (ref 40–150)
ALT SERPL W/O P-5'-P-CCNC: 31 UNIT/L (ref 0–55)
ANION GAP (OHS): 10 MMOL/L (ref 8–16)
AST SERPL-CCNC: 35 UNIT/L (ref 0–50)
BILIRUB DIRECT SERPL-MCNC: 0.2 MG/DL (ref 0.1–0.3)
BILIRUB SERPL-MCNC: 0.6 MG/DL (ref 0.1–1)
BUN SERPL-MCNC: 17 MG/DL (ref 8–23)
CALCIUM SERPL-MCNC: 9.6 MG/DL (ref 8.7–10.5)
CHLORIDE SERPL-SCNC: 107 MMOL/L (ref 95–110)
CHOLEST SERPL-MCNC: 173 MG/DL (ref 120–199)
CHOLEST/HDLC SERPL: 2.9 {RATIO} (ref 2–5)
CO2 SERPL-SCNC: 25 MMOL/L (ref 23–29)
CREAT SERPL-MCNC: 1.2 MG/DL (ref 0.5–1.4)
EAG (OHS): 114 MG/DL (ref 68–131)
GFR SERPLBLD CREATININE-BSD FMLA CKD-EPI: >60 ML/MIN/1.73/M2
GLUCOSE SERPL-MCNC: 104 MG/DL (ref 70–110)
HBA1C MFR BLD: 5.6 % (ref 4–5.6)
HDLC SERPL-MCNC: 60 MG/DL (ref 40–75)
HDLC SERPL: 34.7 % (ref 20–50)
LDLC SERPL CALC-MCNC: 103 MG/DL (ref 63–159)
NONHDLC SERPL-MCNC: 113 MG/DL
POTASSIUM SERPL-SCNC: 5.1 MMOL/L (ref 3.5–5.1)
PROT SERPL-MCNC: 7.5 GM/DL (ref 6–8.4)
SODIUM SERPL-SCNC: 142 MMOL/L (ref 136–145)
TRIGL SERPL-MCNC: 50 MG/DL (ref 30–150)
TSH SERPL-ACNC: 3.14 UIU/ML (ref 0.4–4)

## 2025-08-15 PROCEDURE — 84443 ASSAY THYROID STIM HORMONE: CPT

## 2025-08-15 PROCEDURE — 82248 BILIRUBIN DIRECT: CPT

## 2025-08-15 PROCEDURE — 83036 HEMOGLOBIN GLYCOSYLATED A1C: CPT

## 2025-08-15 PROCEDURE — 36415 COLL VENOUS BLD VENIPUNCTURE: CPT | Mod: PO

## 2025-08-15 PROCEDURE — 99999 PR PBB SHADOW E&M-EST. PATIENT-LVL IV: CPT | Mod: PBBFAC,,, | Performed by: INTERNAL MEDICINE

## 2025-08-15 PROCEDURE — 80053 COMPREHEN METABOLIC PANEL: CPT

## 2025-08-15 PROCEDURE — 80061 LIPID PANEL: CPT

## 2025-08-15 RX ORDER — LORATADINE 10 MG/1
10 TABLET ORAL DAILY
Qty: 30 TABLET | Refills: 11 | Status: SHIPPED | OUTPATIENT
Start: 2025-08-15 | End: 2026-08-15

## 2025-08-19 DIAGNOSIS — E78.5 HYPERLIPIDEMIA, UNSPECIFIED HYPERLIPIDEMIA TYPE: ICD-10-CM

## 2025-08-19 RX ORDER — ATORVASTATIN CALCIUM 40 MG/1
40 TABLET, FILM COATED ORAL DAILY
Qty: 90 TABLET | Refills: 3 | Status: SHIPPED | OUTPATIENT
Start: 2025-08-19 | End: 2026-08-14